# Patient Record
Sex: FEMALE | Race: WHITE | NOT HISPANIC OR LATINO | Employment: FULL TIME | URBAN - NONMETROPOLITAN AREA
[De-identification: names, ages, dates, MRNs, and addresses within clinical notes are randomized per-mention and may not be internally consistent; named-entity substitution may affect disease eponyms.]

---

## 2017-04-25 ENCOUNTER — HISTORY (OUTPATIENT)
Dept: EMERGENCY MEDICINE | Facility: OTHER | Age: 62
End: 2017-04-25

## 2017-05-01 ENCOUNTER — HISTORY (OUTPATIENT)
Dept: FAMILY MEDICINE | Facility: OTHER | Age: 62
End: 2017-05-01

## 2017-05-01 ENCOUNTER — OFFICE VISIT - GICH (OUTPATIENT)
Dept: FAMILY MEDICINE | Facility: OTHER | Age: 62
End: 2017-05-01

## 2017-05-01 DIAGNOSIS — S50.01XA CONTUSION OF RIGHT ELBOW: ICD-10-CM

## 2017-05-01 DIAGNOSIS — I10 ESSENTIAL (PRIMARY) HYPERTENSION: ICD-10-CM

## 2017-05-01 DIAGNOSIS — E03.9 HYPOTHYROIDISM: ICD-10-CM

## 2017-05-01 DIAGNOSIS — E87.6 HYPOKALEMIA: ICD-10-CM

## 2017-05-01 DIAGNOSIS — E83.42 HYPOMAGNESEMIA: ICD-10-CM

## 2017-05-01 DIAGNOSIS — R55 SYNCOPE AND COLLAPSE: ICD-10-CM

## 2017-05-01 LAB
ANION GAP - HISTORICAL: 6 (ref 5–18)
BUN SERPL-MCNC: 17 MG/DL (ref 7–25)
BUN/CREAT RATIO - HISTORICAL: 20
CALCIUM SERPL-MCNC: 10.1 MG/DL (ref 8.6–10.3)
CHLORIDE SERPLBLD-SCNC: 99 MMOL/L (ref 98–107)
CO2 SERPL-SCNC: 29 MMOL/L (ref 21–31)
CREAT SERPL-MCNC: 0.87 MG/DL (ref 0.7–1.3)
GFR IF NOT AFRICAN AMERICAN - HISTORICAL: >60 ML/MIN/1.73M2
GLUCOSE SERPL-MCNC: 101 MG/DL (ref 70–105)
POTASSIUM SERPL-SCNC: 4.2 MMOL/L (ref 3.5–5.1)
SODIUM SERPL-SCNC: 134 MMOL/L (ref 133–143)
TSH - HISTORICAL: 2.29 UIU/ML (ref 0.34–5.6)

## 2017-10-12 ENCOUNTER — OFFICE VISIT - GICH (OUTPATIENT)
Dept: FAMILY MEDICINE | Facility: OTHER | Age: 62
End: 2017-10-12

## 2017-10-12 ENCOUNTER — HISTORY (OUTPATIENT)
Dept: FAMILY MEDICINE | Facility: OTHER | Age: 62
End: 2017-10-12

## 2017-10-12 DIAGNOSIS — Z23 ENCOUNTER FOR IMMUNIZATION: ICD-10-CM

## 2017-10-12 DIAGNOSIS — Z12.31 ENCOUNTER FOR SCREENING MAMMOGRAM FOR MALIGNANT NEOPLASM OF BREAST: ICD-10-CM

## 2017-10-12 DIAGNOSIS — E03.9 HYPOTHYROIDISM: ICD-10-CM

## 2017-10-12 DIAGNOSIS — I10 ESSENTIAL (PRIMARY) HYPERTENSION: ICD-10-CM

## 2017-10-12 LAB
ANION GAP - HISTORICAL: 7 (ref 5–18)
BUN SERPL-MCNC: 9 MG/DL (ref 7–25)
BUN/CREAT RATIO - HISTORICAL: 12
CALCIUM SERPL-MCNC: 9.5 MG/DL (ref 8.6–10.3)
CHLORIDE SERPLBLD-SCNC: 105 MMOL/L (ref 98–107)
CHOL/HDL RATIO - HISTORICAL: 3.53
CHOLESTEROL TOTAL: 233 MG/DL
CO2 SERPL-SCNC: 28 MMOL/L (ref 21–31)
CREAT SERPL-MCNC: 0.74 MG/DL (ref 0.7–1.3)
GFR IF NOT AFRICAN AMERICAN - HISTORICAL: >60 ML/MIN/1.73M2
GLUCOSE SERPL-MCNC: 86 MG/DL (ref 70–105)
HDLC SERPL-MCNC: 66 MG/DL (ref 23–92)
LDLC SERPL CALC-MCNC: 142 MG/DL
MAGNESIUM SERPL-MCNC: 2.1 MG/DL (ref 1.9–2.7)
NON-HDL CHOLESTEROL - HISTORICAL: 167 MG/DL
POTASSIUM SERPL-SCNC: 3.5 MMOL/L (ref 3.5–5.1)
PROVIDER ORDERDED STATUS - HISTORICAL: ABNORMAL
SODIUM SERPL-SCNC: 140 MMOL/L (ref 133–143)
TRIGL SERPL-MCNC: 125 MG/DL
TSH - HISTORICAL: 3.27 UIU/ML (ref 0.34–5.6)

## 2017-10-18 ENCOUNTER — COMMUNICATION - GICH (OUTPATIENT)
Dept: FAMILY MEDICINE | Facility: OTHER | Age: 62
End: 2017-10-18

## 2017-11-28 ENCOUNTER — HISTORY (OUTPATIENT)
Dept: RADIOLOGY | Facility: OTHER | Age: 62
End: 2017-11-28

## 2017-11-28 ENCOUNTER — OFFICE VISIT - GICH (OUTPATIENT)
Dept: FAMILY MEDICINE | Facility: OTHER | Age: 62
End: 2017-11-28

## 2017-11-28 ENCOUNTER — HOSPITAL ENCOUNTER (OUTPATIENT)
Dept: RADIOLOGY | Facility: OTHER | Age: 62
End: 2017-11-28
Attending: FAMILY MEDICINE

## 2017-11-28 ENCOUNTER — HISTORY (OUTPATIENT)
Dept: FAMILY MEDICINE | Facility: OTHER | Age: 62
End: 2017-11-28

## 2017-11-28 DIAGNOSIS — R51.9 HEADACHE: ICD-10-CM

## 2017-11-28 DIAGNOSIS — Z12.31 ENCOUNTER FOR SCREENING MAMMOGRAM FOR MALIGNANT NEOPLASM OF BREAST: ICD-10-CM

## 2017-11-28 DIAGNOSIS — H66.93 OTITIS MEDIA OF BOTH EARS: ICD-10-CM

## 2017-12-06 ENCOUNTER — HOSPITAL ENCOUNTER (OUTPATIENT)
Dept: RADIOLOGY | Facility: OTHER | Age: 62
End: 2017-12-06
Attending: FAMILY MEDICINE

## 2017-12-06 DIAGNOSIS — R51.9 HEADACHE: ICD-10-CM

## 2017-12-11 ENCOUNTER — COMMUNICATION - GICH (OUTPATIENT)
Dept: FAMILY MEDICINE | Facility: OTHER | Age: 62
End: 2017-12-11

## 2017-12-13 ENCOUNTER — COMMUNICATION - GICH (OUTPATIENT)
Dept: FAMILY MEDICINE | Facility: OTHER | Age: 62
End: 2017-12-13

## 2017-12-19 ENCOUNTER — OFFICE VISIT - GICH (OUTPATIENT)
Dept: FAMILY MEDICINE | Facility: OTHER | Age: 62
End: 2017-12-19

## 2017-12-19 ENCOUNTER — HISTORY (OUTPATIENT)
Dept: FAMILY MEDICINE | Facility: OTHER | Age: 62
End: 2017-12-19

## 2017-12-19 DIAGNOSIS — G43.009 MIGRAINE WITHOUT AURA AND WITHOUT STATUS MIGRAINOSUS, NOT INTRACTABLE: ICD-10-CM

## 2017-12-28 NOTE — TELEPHONE ENCOUNTER
Patient Information     Patient Name MRN Sex Steffany Gamez [id number] Female 1955      Telephone Encounter by Ari Villeda LPN at 10/18/2017 12:05 PM     Author:  Ari Villeda LPN Service:  (none) Author Type:  NURS- Licensed Practical Nurse     Filed:  10/18/2017 12:06 PM Encounter Date:  10/18/2017 Status:  Signed     :  Ari Villeda LPN (NURS- Licensed Practical Nurse)            Labs are in but not released. Please adivse and send back to the FM pool.  Ari Villeda LPN ..............10/18/2017 12:06 PM

## 2017-12-28 NOTE — PROGRESS NOTES
Patient Information     Patient Name MRN Sex Steffany Lewis [id number] Female 1955      Progress Notes by Cece Freeman MD at 10/12/2017  3:00 PM     Author:  Cece Freeman MD Service:  (none) Author Type:  Physician     Filed:  10/14/2017 11:31 AM Encounter Date:  10/12/2017 Status:  Signed     :  Cece Freeman MD (Physician)            SUBJECTIVE:    Steffany Altamirano is a 62 y.o. female who presents for multiple concerns    HPI Comments: Nursing Notes:   Rylie Hein  10/12/2017  3:13 PM  Signed  Patient is here for annual physical.  Rylie Hein LPN .............10/12/2017  3:12 PM      Rylie Hein  10/12/2017  3:42 PM  Signed  Patient states has been having dizzy spells for past 3 weeks, lasting few   mins at most. Having 3-4 times a day depending on what she is doing.   Notices most when doing stairs, and ready to get out of bed. Patient also   complaining of having headaches located at forehead area, states will have   to go lay down in dark room.  Rylie Hein LPN .............10/12/2017  3:17 PM      Fell and hit head, in 2017. HAs and dizzy spells since. Chronic daily HA. No change in medications        Allergies     Allergen  Reactions     Paper Tape [Adhesive] Contact Dermatitis   ,   Current Outpatient Prescriptions on File Prior to Visit       Medication  Sig Dispense Refill     levothyroxine (SYNTHROID) 50 mcg tablet Take 1 tablet by mouth before breakfast. 90 tablet 3     lisinopril-hydrochlorothiazide, 20-25 mg, (PRINZIDE, ZESTORETIC) 20-25 mg per tablet Take 1 tablet by mouth once daily. 90 tablet 4     magnesium oxide (MAG-) 400 mg tablet Take 1 tablet by mouth once daily. 90 tablet 3     potassium chloride (KLOR-CON M20) 20 mEq Extended-Release tablet Take 1 tablet by mouth once daily with a meal. 90 tablet 3     No current facility-administered medications on file prior to visit.     and   Patient Active Problem List      Diagnosis  "Date Noted     Chronic dermatitis of hands 03/24/2016     Diverticulosis of sigmoid colon 01/20/2014     Lipoma of other skin and subcutaneous tissue 01/29/2013     Dyshidrotic eczema 06/22/2012     PREDIABETES 03/15/2012     UMBILICAL HERNIA 02/27/2012     SJOGREN'S SYNDROME 01/26/2012     PLANTAR FASCIITIS, BILATERAL 10/21/2010     DEGENERATIVE DISC DISEASE, CERVICAL SPINE, W/RADICULOPATHY      HYPERLIPIDEMIA      OBESITY      HYPERTENSION        REVIEW OF SYSTEMS:  Review of Systems   Constitutional: Negative for chills, fever, malaise/fatigue and weight loss.   HENT: Negative for congestion, ear discharge, ear pain, hearing loss, sinus pain, sore throat and tinnitus.    Eyes: Negative for blurred vision and double vision.   Respiratory: Negative for stridor.    Cardiovascular: Negative for chest pain and palpitations.   Musculoskeletal: Negative for back pain, falls, joint pain, myalgias and neck pain.   Skin: Negative for itching and rash.   Neurological: Positive for dizziness and headaches. Negative for tingling, tremors, sensory change, speech change, focal weakness, seizures and loss of consciousness.       OBJECTIVE:  /84  Pulse 58  Ht 1.562 m (5' 1.5\")  Wt 89.6 kg (197 lb 9.6 oz)  BMI 36.73 kg/m2    EXAM:   Physical Exam   Constitutional: She is oriented to person, place, and time and well-developed, well-nourished, and in no distress.   HENT:   Head: Normocephalic and atraumatic.   Mouth/Throat: Oropharynx is clear and moist.   Eyes: Conjunctivae are normal.   Neck: No thyromegaly present.   Cardiovascular: Normal rate and regular rhythm.    Pulmonary/Chest: Effort normal.   Lymphadenopathy:     She has no cervical adenopathy.   Neurological: She is alert and oriented to person, place, and time. She has normal reflexes. She displays normal reflexes. No cranial nerve deficit. She exhibits normal muscle tone. Gait normal. Coordination normal. GCS score is 15.       ASSESSMENT/PLAN:    ICD-10-CM  "   1. HYPERTENSION I10 BASIC METABOLIC PANEL      MAGNESIUM      BASIC METABOLIC PANEL      MAGNESIUM   2. Hypothyroidism, unspecified type E03.9 TSH      TSH      LIPID PANEL      LIPID PANEL   3. Needs flu shot Z23 FLU VACCINE => 3 YRS PF QUADRIVALENT IIV4 IM      AR ADMIN VACC INITIAL SEASONAL AFFILIATE ONLY   4. Breast cancer screening Z12.31 XR MAMMO BILAT SCREENING        Plan:  Due for labs and refills, rule out metabolic causes of headaches, doubt related to previous head injury which was greater than 3months ago. Could consider CT if persists  Schedule mammogram and given flu shot    Total of 25 minutes was spent with patient in counseling and coordination of care.  Cece De La Cruz MD  11:30 AM 10/14/2017

## 2017-12-28 NOTE — PROGRESS NOTES
Patient Information     Patient Name MRN Sex Steffany Gamez [id number] Female 1955      Progress Notes by Cece Freeman MD at 2017  2:00 PM     Author:  Cece Freeman MD Service:  (none) Author Type:  Physician     Filed:  2017 12:32 PM Encounter Date:  2017 Status:  Signed     :  Cece Freeman MD (Physician)            SUBJECTIVE:    Steffany Altamirano is a 62 y.o. female who presents for right ear pain and ongoing HAs    HPI Comments: Daily posterior HAs since falling and hitting her head, no LOC, seen in ER  HAs worse as day progresses, no assoc neuro symptoms  NO new medications, BP at goal    New concern of right ear pain, severe, throbbing, no recent fever or drainage      Allergies     Allergen  Reactions     Paper Tape [Adhesive] Contact Dermatitis   ,   Current Outpatient Prescriptions on File Prior to Visit       Medication  Sig Dispense Refill     levothyroxine (SYNTHROID) 50 mcg tablet Take 1 tablet by mouth before breakfast. 90 tablet 3     lisinopril-hydrochlorothiazide, 20-25 mg, (PRINZIDE, ZESTORETIC) 20-25 mg per tablet Take 1 tablet by mouth once daily. 90 tablet 4     magnesium oxide (MAG-) 400 mg tablet Take 1 tablet by mouth once daily. 90 tablet 3     potassium chloride (KLOR-CON M20) 20 mEq Extended-Release tablet Take 1 tablet by mouth once daily with a meal. 90 tablet 3     No current facility-administered medications on file prior to visit.     and   Patient Active Problem List      Diagnosis Date Noted     Chronic dermatitis of hands 2016     Diverticulosis of sigmoid colon 2014     Lipoma of other skin and subcutaneous tissue 2013     Dyshidrotic eczema 2012     PREDIABETES 03/15/2012     UMBILICAL HERNIA 2012     SJOGREN'S SYNDROME 2012     PLANTAR FASCIITIS, BILATERAL 10/21/2010     DEGENERATIVE DISC DISEASE, CERVICAL SPINE, W/RADICULOPATHY      HYPERLIPIDEMIA      OBESITY       HYPERTENSION        REVIEW OF SYSTEMS:  Review of Systems   Constitutional: Negative for chills, fever, malaise/fatigue and weight loss.   HENT: Positive for ear pain and hearing loss. Negative for congestion, ear discharge and nosebleeds.    Eyes: Negative for blurred vision, double vision, photophobia and pain.   Cardiovascular: Negative for chest pain and palpitations.   Gastrointestinal: Negative for nausea and vomiting.   Musculoskeletal: Negative for back pain and neck pain.   Skin: Negative for itching and rash.   Neurological: Positive for headaches. Negative for dizziness, tingling, tremors, sensory change, speech change, focal weakness, seizures and loss of consciousness.       OBJECTIVE:  /84  Pulse 60  Temp 98.8  F (37.1  C) (Tympanic)  Wt 88.9 kg (196 lb)  BMI 36.43 kg/m2    EXAM:   Physical Exam   Constitutional: She is oriented to person, place, and time and well-developed, well-nourished, and in no distress.   HENT:   Head: Normocephalic and atraumatic.   Right Ear: External ear normal.   Left Ear: External ear normal.   Nose: Nose normal.   Mouth/Throat: Oropharynx is clear and moist.   Eyes: EOM are normal.   Neck: No thyromegaly present.   Cardiovascular: Normal rate.    Lymphadenopathy:     She has no cervical adenopathy.   Neurological: She is alert and oriented to person, place, and time. She has normal reflexes. She displays normal reflexes. No cranial nerve deficit. She exhibits normal muscle tone. Gait normal. Coordination normal.   Skin: No rash noted.   Psychiatric: Affect normal.       ASSESSMENT/PLAN:    ICD-10-CM    1. Bilateral otitis media, unspecified otitis media type H66.93 amoxicillin (AMOXIL) 875 mg tablet   2. Chronic daily headache R51 MR HEAD BRAIN WWO      CANCELED: MR HEAD BRAIN WO        Plan:  She reports consistent HAs since falling and hitting her head > 4 months, no focal findings on exam. She is quite concerned the trauma is the source of her pain. Will  proceed with MRI  Has acute AOM, will treat with amox.    There are no Patient Instructions on file for this visit.  Cece De La Cruz MD  12:29 PM 11/30/2017

## 2017-12-28 NOTE — PROGRESS NOTES
Patient Information     Patient Name MRN Sex Steffany Gamez [id number] Female 1955      Progress Notes by Sharifa Huber at 2017  9:12 AM     Author:  Sharifa Huebr Service:  (none) Author Type:  (none)     Filed:  2017  9:12 AM Date of Service:  2017  9:12 AM Status:  Signed     :  Sharifa Huber            Falls Risk Criteria:    Age 65 and older or under age 4        Sensory deficits    Poor vision    Use of ambulatory aides    Impaired judgment    Unable to walk independently    Meets High Risk criteria for falls:  no

## 2017-12-28 NOTE — TELEPHONE ENCOUNTER
Patient Information     Patient Name MRN Steffany Stallworth [id number] Female 1955      Telephone Encounter by Cece Freeman MD at 10/20/2017  8:19 AM     Author:  Cece Freeman MD Service:  (none) Author Type:  Physician     Filed:  10/20/2017  8:19 AM Encounter Date:  10/18/2017 Status:  Signed     :  Cece Freeman MD (Physician)            Sent letter  Cece De La Cruz MD  8:19 AM 10/20/2017

## 2017-12-28 NOTE — TELEPHONE ENCOUNTER
Patient Information     Patient Name MRN Sex Steffany Gamez [id number] Female 1955      Telephone Encounter by Terri Chowdhury at 10/18/2017 11:52 AM     Author:  Terri Chowdhury Service:  (none) Author Type:  (none)     Filed:  10/18/2017 11:53 AM Encounter Date:  10/18/2017 Status:  Signed     :  Terri Chowdhury            TYP-Pt called looking for lab results. Says was expecting them on Monday? Thank You.  Terri Chowdhury ....................  10/18/2017   11:53 AM

## 2017-12-30 NOTE — NURSING NOTE
Patient Information     Patient Name MRN Sex Steffany Gamez [id number] Female 1955      Nursing Note by Rylie Hein at 2017  2:00 PM     Author:  Rylie Hein Service:  (none) Author Type:  (none)     Filed:  2017  2:19 PM Encounter Date:  2017 Status:  Signed     :  Rylie Hein            Patient is here for right ear issues. States unable to hear good, feels like is muffled. Started a couple weeks ago. Patient also wondering about migraines, and about having scan on head due to a fall and hitting head.   Rylie Hein LPN .............2017  1:51 PM

## 2017-12-30 NOTE — NURSING NOTE
Patient Information     Patient Name MRN Sex Steffany Gamez [id number] Female 1955      Nursing Note by Rylie Hein at 10/12/2017  3:00 PM     Author:  Rylie Hein Service:  (none) Author Type:  (none)     Filed:  10/12/2017  3:42 PM Encounter Date:  10/12/2017 Status:  Signed     :  Rylie Hein            Patient states has been having dizzy spells for past 3 weeks, lasting few mins at most. Having 3-4 times a day depending on what she is doing. Notices most when doing stairs, and ready to get out of bed. Patient also complaining of having headaches located at forehead area, states will have to go lay down in dark room.  Rylie Hein LPN .............10/12/2017  3:17 PM

## 2017-12-30 NOTE — NURSING NOTE
Patient Information     Patient Name MRN Sex Steffany Gamez [id number] Female 1955      Nursing Note by Rylie Hein at 10/12/2017  3:00 PM     Author:  Rylie Hein Service:  (none) Author Type:  (none)     Filed:  10/12/2017  3:13 PM Encounter Date:  10/12/2017 Status:  Signed     :  Rylie Hein            Patient is here for annual physical.  Rylie Hein LPN .............10/12/2017  3:12 PM

## 2018-01-04 NOTE — NURSING NOTE
Patient Information     Patient Name MRN Sex Steffany Gamez [id number] Female 1955      Nursing Note by Olga Raza at 2017 11:30 AM     Author:  Olga Raza Service:  (none) Author Type:  (none)     Filed:  2017 11:49 AM Encounter Date:  2017 Status:  Signed     :  Olga Raza            COMING IN FOR A F/U FROM ER, HITTING HER HEAD  Olga Raza ....................  2017   11:22 AM

## 2018-01-04 NOTE — PROGRESS NOTES
Patient Information     Patient Name MRN Sex Steffany Gamez [id number] Female 1955      Progress Notes by Bc Gustafson MD at 2017 11:30 AM     Author:  Bc Gustafson MD Service:  (none) Author Type:  Physician     Filed:  2017  9:31 AM Encounter Date:  2017 Status:  Signed     :  Bc Gustafson MD (Physician)            SUBJECTIVE:    Steffany Altamirano is a 61 y.o. female who presents for emergency department follow up and med refill    HPI    Last week while I was removing a skin lesion from her , she became syncopal.  Stood up, walked into the tapia where she fell and his her head and right elbow.  She says heart rate was 44.  Was then taken to the emergency department by the rapid response team.  Notes reviewed as well as lab.  Currently reports right elbow pain with an abrasion and anti-hypertensive headache.  Using tylenol every 6 hours.  Ice packs also.  Mentation is normal.  No nausea/vomiting.  A little dizzy when she gets up too fast.  No chest pain at all.  Potassium was low at 3.2 and she was placed on replacement.    Wants all of her medications refilled as well.    Allergies     Allergen  Reactions     Paper Tape [Adhesive] Contact Dermatitis   ,   No current outpatient prescriptions on file prior to visit.     No current facility-administered medications on file prior to visit.    ,   Past Medical History:     Diagnosis  Date     Hypertension     Hypertension      Prediabetes      Sjogren's disease (HC)     Possible Sjogren's     and   Past Surgical History:      Procedure  Laterality Date      SECTION  ,     Epigastric hernia repair by [facility]       CHOLECYSTECTOMY  1984     COLONOSCOPY DIAGNOSTIC  14    F/U        HERNIA REPAIR  10/25/02    Epigastric hernia repair by [facility]       LIPOMA RESECTION  13    LUQ       ROTATOR CUFF REPAIR Right     Right Dr. Kee GAINES AND BSO  1996    Supracervical hysterectomy and  bilateral oophorectomy       TUBAL LIGATION  1976       REVIEW OF SYSTEMS:  Review of Systems   Constitutional: Negative for chills and fever.   Eyes: Negative for blurred vision and double vision.   Respiratory: Negative for shortness of breath.    Cardiovascular: Negative for chest pain.   Gastrointestinal: Negative for nausea and vomiting.   Genitourinary: Negative for dysuria, frequency and urgency.   Musculoskeletal: Positive for falls and joint pain.   Skin: Negative for itching and rash.   Neurological: Positive for dizziness and headaches.   Endo/Heme/Allergies: Does not bruise/bleed easily.   Psychiatric/Behavioral: Negative for memory loss. The patient does not have insomnia.        OBJECTIVE:  /68  Resp 16    EXAM:   Physical Exam   Constitutional: She is oriented to person, place, and time and well-developed, well-nourished, and in no distress. No distress.   HENT:   Head: Normocephalic and atraumatic.   Right Ear: External ear normal.   Left Ear: External ear normal.   Eyes: Pupils are equal, round, and reactive to light.   Neck: Normal range of motion. No thyromegaly present.   Cardiovascular: Normal rate, regular rhythm and normal heart sounds.  Exam reveals no gallop and no friction rub.    No murmur heard.  Pulmonary/Chest: Effort normal. No respiratory distress. She has no wheezes. She has no rales.   Abdominal: Soft. She exhibits no distension. There is no tenderness. There is no rebound and no guarding.   Musculoskeletal:   Mild abrasion distal to right olecranon.  Mild edema, full range of motion and no bony pain on palpation.   Neurological: She is alert and oriented to person, place, and time.   Skin: Skin is warm and dry. No rash noted. She is not diaphoretic. No erythema.   Psychiatric: Memory, affect and judgment normal.   Vitals reviewed.      ASSESSMENT/PLAN:    ICD-10-CM    1. Vasovagal syncope R55    2. HYPERTENSION I10 lisinopril-hydrochlorothiazide, 20-25 mg, (PRINZIDE,  ZESTORETIC) 20-25 mg per tablet      BASIC METABOLIC PANEL   3. Contusion of right elbow, initial encounter S50.01XA    4. Hypokalemia E87.6 potassium chloride (KLOR-CON M20) 20 mEq Extended-Release tablet   5. Hypothyroidism, unspecified type E03.9 levothyroxine (SYNTHROID) 50 mcg tablet      TSH   6. Hypomagnesemia E83.42 magnesium oxide (MAG-) 400 mg tablet        Plan:  Given she is on 50 mg total of hyrdrochlorothiazide, and there is nearly no additional blood pressure benefit from 50 compared to 25, along with the syncope and hypokalemia, will have her stop the 25 mg hyrdrochlorothiazide and stay on zestoretic.  Has no other worrisome findings that would make me think of cardia arrhythmias as the trigger for the fall.   Refilled her Potassium.  Ice and rest for the elbow, soap and water to the abrasion.    Bc Gustafson MD ....................  5/1/2017   12:03 PM

## 2018-01-24 ENCOUNTER — DOCUMENTATION ONLY (OUTPATIENT)
Dept: FAMILY MEDICINE | Facility: OTHER | Age: 63
End: 2018-01-24

## 2018-01-24 PROBLEM — E78.5 HYPERLIPIDEMIA: Status: ACTIVE | Noted: 2018-01-24

## 2018-01-24 PROBLEM — I10 HYPERTENSION: Status: ACTIVE | Noted: 2018-01-24

## 2018-01-24 PROBLEM — E66.9 OBESITY: Status: ACTIVE | Noted: 2018-01-24

## 2018-01-24 PROBLEM — M50.20 DISPLACEMENT OF CERVICAL INTERVERTEBRAL DISC WITHOUT MYELOPATHY: Status: ACTIVE | Noted: 2018-01-24

## 2018-01-24 RX ORDER — MAGNESIUM OXIDE 400 MG/1
1 TABLET ORAL DAILY
Status: ON HOLD | COMMUNITY
Start: 2017-05-01 | End: 2020-05-21

## 2018-01-24 RX ORDER — LISINOPRIL AND HYDROCHLOROTHIAZIDE 20; 25 MG/1; MG/1
1 TABLET ORAL DAILY
COMMUNITY
Start: 2017-05-01 | End: 2018-04-09

## 2018-01-24 RX ORDER — AMITRIPTYLINE HYDROCHLORIDE 10 MG/1
1 TABLET ORAL AT BEDTIME
COMMUNITY
Start: 2017-12-19 | End: 2018-04-09

## 2018-01-24 RX ORDER — POTASSIUM CHLORIDE 1500 MG/1
1 TABLET, EXTENDED RELEASE ORAL
COMMUNITY
Start: 2017-05-01 | End: 2018-04-09

## 2018-01-24 RX ORDER — LEVOTHYROXINE SODIUM 50 UG/1
1 TABLET ORAL
COMMUNITY
Start: 2017-05-01 | End: 2018-04-09

## 2018-01-24 RX ORDER — RIZATRIPTAN BENZOATE 5 MG/1
1 TABLET, ORALLY DISINTEGRATING ORAL 2 TIMES DAILY PRN
COMMUNITY
Start: 2017-12-19 | End: 2018-11-30

## 2018-01-27 VITALS
HEIGHT: 62 IN | HEART RATE: 58 BPM | BODY MASS INDEX: 36.36 KG/M2 | WEIGHT: 197.6 LBS | DIASTOLIC BLOOD PRESSURE: 84 MMHG | SYSTOLIC BLOOD PRESSURE: 148 MMHG

## 2018-01-27 VITALS — SYSTOLIC BLOOD PRESSURE: 126 MMHG | RESPIRATION RATE: 16 BRPM | DIASTOLIC BLOOD PRESSURE: 68 MMHG

## 2018-01-27 VITALS
HEART RATE: 60 BPM | DIASTOLIC BLOOD PRESSURE: 84 MMHG | TEMPERATURE: 98.8 F | WEIGHT: 196 LBS | SYSTOLIC BLOOD PRESSURE: 130 MMHG

## 2018-02-09 VITALS
TEMPERATURE: 97.2 F | SYSTOLIC BLOOD PRESSURE: 118 MMHG | BODY MASS INDEX: 36.25 KG/M2 | DIASTOLIC BLOOD PRESSURE: 80 MMHG | HEART RATE: 66 BPM | WEIGHT: 195 LBS

## 2018-02-12 NOTE — TELEPHONE ENCOUNTER
Patient Information     Patient Name MRN Steffany Stallworth [id number] Female 1955      Telephone Encounter by Meghna Cervantes at 2017  9:22 AM     Author:  Meghna Cervantes Service:  (none) Author Type:  (none)     Filed:  2017  9:22 AM Encounter Date:  2017 Status:  Signed     :  Meghna Cervantes            Left message for pt to call clinic back.  Meghna Cervantes

## 2018-02-12 NOTE — TELEPHONE ENCOUNTER
Patient Information     Patient Name MRN Sex Steffany Gamez [id number] Female 1955      Telephone Encounter by Meghna Cervantes at 2017  3:53 PM     Author:  Meghna Cervantes Service:  (none) Author Type:  (none)     Filed:  2017  3:53 PM Encounter Date:  2017 Status:  Signed     :  Meghna Cervantes            Notified pt of MRI results that are in a previous phone note.  Meghna Cervantes

## 2018-02-12 NOTE — TELEPHONE ENCOUNTER
Patient Information     Patient Name MRN Sex Steffany Gamez [id number] Female 1955      Telephone Encounter by Shaina Jamil at 2017 12:15 PM     Author:  Shaina Jamil Service:  (none) Author Type:  (none)     Filed:  2017 12:15 PM Encounter Date:  2017 Status:  Signed     :  Shaina Jamil            Patient is looking for MRI results from last week.    Shaina Jamil LPN.................. 2017 12:15 PM

## 2018-02-12 NOTE — PROGRESS NOTES
Patient Information     Patient Name MRN Sex Steffany Gamez [id number] Female 1955      Progress Notes by Laura Shoemaker at 2017  9:54 AM     Author:  Laura Shoemaker Service:  (none) Author Type:  Other Clinical Staff     Filed:  2017  9:54 AM Date of Service:  2017  9:54 AM Status:  Signed     :  Laura Shoemaker (Other Clinical Staff)            Falls Risk Criteria:    Age 65 and older or under age 4        Sensory deficits    Poor vision    Use of ambulatory aides    Impaired judgment    Unable to walk independently    Meets High Risk criteria for falls:  no

## 2018-02-12 NOTE — TELEPHONE ENCOUNTER
Patient Information     Patient Name MRN Sex Steffany Gamez [id number] Female 1955      Telephone Encounter by Meghna Cervantes at 2017  3:52 PM     Author:  Meghna Cervantes Service:  (none) Author Type:  (none)     Filed:  2017  3:53 PM Encounter Date:  2017 Status:  Signed     :  Meghna Cervantes            Notified pt of MRI results that were in a previous phone note.  Meghna Cervantes

## 2018-02-12 NOTE — NURSING NOTE
Patient Information     Patient Name MRN Sex Steffany Gamez [id number] Female 1955      Nursing Note by Rylie Hein at 2017  8:15 AM     Author:  Rylie Hein Service:  (none) Author Type:  (none)     Filed:  2017  8:32 AM Encounter Date:  2017 Status:  Signed     :  Rylie Hein            Patient is here for follow up of MRI results for headaches. Patient states still having headaches daily.  Rylie Hein LPN .............2017  8:17 AM

## 2018-02-12 NOTE — TELEPHONE ENCOUNTER
Patient Information     Patient Name MRN Sex Steffany Gamez [id number] Female 1955      Telephone Encounter by Bridget Maguire at 2017 12:13 PM     Author:  Bridget Maguire Service:  (none) Author Type:  (none)     Filed:  2017 12:13 PM Encounter Date:  2017 Status:  Signed     :  Bridget Maguire            TYP- Patient is looking for results from scan last week.  Thank you

## 2018-02-12 NOTE — TELEPHONE ENCOUNTER
Patient Information     Patient Name MRN Sex Steffany Gamez [id number] Female 1955      Telephone Encounter by Stephenie Schroeder at 2017  9:09 AM     Author:  Stephenie Schroeder Service:  (none) Author Type:  (none)     Filed:  2017  9:12 AM Encounter Date:  2017 Status:  Signed     :  Stephenie Schroeder            Patient called today stating that dr mark called her daughters to give her MRI results.   Patient called back to give us her new phone number.  Her number has been updated in the patients chart.  Please call patient with her MRI results.  Thank You  Stephenie Schroeder ....................  2017   9:12 AM

## 2018-02-12 NOTE — PATIENT INSTRUCTIONS
Patient Information     Patient Name MRN Sex Steffany Gamez [id number] Female 1955      Patient Instructions by Cece Freeman MD at 2017  8:15 AM     Author:  Cece Freeman MD Service:  (none) Author Type:  Physician     Filed:  2017  8:40 AM Encounter Date:  2017 Status:  Signed     :  Cece Freeman MD (Physician)            MRI was normal  Recent labs due not point to cause of headaches  Most likely Migraine Headaches  Given frequency of headaches, recommend daily preventive medication  Keep track of headaches in log, triggers, frequency  Also try maxalt for abortive treatment for severe headaches

## 2018-02-12 NOTE — TELEPHONE ENCOUNTER
Patient Information     Patient Name MRN Sex Steffany Gamez [id number] Female 1955      Telephone Encounter by Cece Freemna MD at 2017 10:34 AM     Author:  Cece Freeman MD Service:  (none) Author Type:  Physician     Filed:  2017 10:34 AM Encounter Date:  2017 Status:  Signed     :  Cece Freeman MD (Physician)            MRI was normal, no mass/bleeding or trauma  Cece De La Cruz MD  10:34 AM 2017

## 2018-02-12 NOTE — TELEPHONE ENCOUNTER
Patient Information     Patient Name MRN Steffany Stallworth [id number] Female 1955      Telephone Encounter by Meghna Cervantes at 2017  3:54 PM     Author:  Meghna Cervantes Service:  (none) Author Type:  (none)     Filed:  2017  3:55 PM Encounter Date:  2017 Status:  Signed     :  Meghna Cervantes            Notified pt of Dr. Freeman's message below.  Meghna Cervantes

## 2018-02-12 NOTE — TELEPHONE ENCOUNTER
Patient Information     Patient Name MRN Steffany Stallworth [id number] Female 1955      Telephone Encounter by Rlyie Hein at 2017 11:21 AM     Author:  Rylie Hein Service:  (none) Author Type:  (none)     Filed:  2017 11:22 AM Encounter Date:  2017 Status:  Signed     :  Rylie Hein            Left message to call back with daughter adam. Number given by Patient is daughters.  Rylie Hein LPN ....................  2017   11:21 AM

## 2018-02-12 NOTE — TELEPHONE ENCOUNTER
Patient Information     Patient Name MRN Sex Steffany Gamez [id number] Female 1955      Telephone Encounter by Stephenie Schroeder at 2017  3:46 PM     Author:  Stephenie Schroeder Service:  (none) Author Type:  (none)     Filed:  2017  3:48 PM Encounter Date:  2017 Status:  Signed     :  Stephenie Schroeder              Patient knows that Pete is out of the office until Tuesday.  But she is wondering if anyone can let her know her results of her MRI. Please call patient back Thank You.   Stephenie Schroeder ....................  2017   3:48 PM

## 2018-02-12 NOTE — TELEPHONE ENCOUNTER
"Patient Information     Patient Name MRN Steffany Stallworth [id number] Female 1955      Telephone Encounter by Ernesto Cervantes at 2017 10:42 AM     Author:  Ernesto Cervantes Service:  (none) Author Type:  (none)     Filed:  2017 10:43 AM Encounter Date:  2017 Status:  Signed     :  Ernesto Cervantes            \"Discontinued number\". = [phone]  \"No Voicemail set up\". = [phone]    Ernesto Cervantes ....................  2017   10:43 AM                 "

## 2018-02-12 NOTE — PROGRESS NOTES
Patient Information     Patient Name MRN Sex Steffany Gamez [id number] Female 1955      Progress Notes by Cece Freeman MD at 2017  8:15 AM     Author:  Cece Freeman MD Service:  (none) Author Type:  Physician     Filed:  2017 12:11 PM Encounter Date:  2017 Status:  Signed     :  Cece Freeman MD (Physician)            SUBJECTIVE:    Steffany Altamirano is a 62 y.o. female who presents for HAs, recent MRI scan was normal    HPI Comments: Presents for review of recent MRI which was completed for HAs and previous head trauma.    She complains of nearly daily HAs, mostly occur at the end of the day. Dull frontal, severe, lasts hours. Take excedrin every day.    No focal neuro symptoms  No recent change in medications, no known triggers      Allergies     Allergen  Reactions     Paper Tape [Adhesive] Contact Dermatitis   ,   Current Outpatient Prescriptions on File Prior to Visit       Medication  Sig Dispense Refill     levothyroxine (SYNTHROID) 50 mcg tablet Take 1 tablet by mouth before breakfast. 90 tablet 3     lisinopril-hydrochlorothiazide, 20-25 mg, (PRINZIDE, ZESTORETIC) 20-25 mg per tablet Take 1 tablet by mouth once daily. 90 tablet 4     magnesium oxide (MAG-) 400 mg tablet Take 1 tablet by mouth once daily. 90 tablet 3     potassium chloride (KLOR-CON M20) 20 mEq Extended-Release tablet Take 1 tablet by mouth once daily with a meal. 90 tablet 3     No current facility-administered medications on file prior to visit.     and   Patient Active Problem List      Diagnosis Date Noted     Chronic dermatitis of hands 2016     Diverticulosis of sigmoid colon 2014     Lipoma of other skin and subcutaneous tissue 2013     Dyshidrotic eczema 2012     PREDIABETES 03/15/2012     UMBILICAL HERNIA 2012     SJOGREN'S SYNDROME 2012     PLANTAR FASCIITIS, BILATERAL 10/21/2010     DEGENERATIVE DISC DISEASE, CERVICAL SPINE,  W/RADICULOPATHY      HYPERLIPIDEMIA      OBESITY      HYPERTENSION        REVIEW OF SYSTEMS:  Review of Systems   Constitutional: Negative for chills, diaphoresis, fever, malaise/fatigue and weight loss.   HENT: Negative for congestion and hearing loss.    Eyes: Negative for blurred vision and double vision.   Gastrointestinal: Negative for nausea and vomiting.   Neurological: Positive for headaches. Negative for dizziness, tingling, tremors, sensory change, speech change, focal weakness, seizures, loss of consciousness and weakness.       OBJECTIVE:  /80 (Cuff Site: Right Arm, Position: Sitting, Cuff Size: Adult Large)  Pulse 66  Temp 97.2  F (36.2  C) (Tympanic)  Wt 88.5 kg (195 lb)  BMI 36.25 kg/m2    EXAM:   Physical Exam   Constitutional: She is oriented to person, place, and time and well-developed, well-nourished, and in no distress.   Eyes: EOM are normal.   Neck: No JVD present. No thyromegaly present.   Lymphadenopathy:     She has no cervical adenopathy.   Neurological: She is alert and oriented to person, place, and time. She has normal reflexes. She displays normal reflexes. No cranial nerve deficit. She exhibits normal muscle tone. Gait normal. Coordination normal. GCS score is 15.   Psychiatric: Memory, affect and judgment normal.       ASSESSMENT/PLAN:    ICD-10-CM    1. Migraine without aura and without status migrainosus, not intractable G43.009 amitriptyline (ELAVIL) 10 mg tablet      rizatriptan (MAXALT MLT) 5 mg disintegrating tablet        Plan:    Patient Instructions   MRI was normal  Recent labs due not point to cause of headaches  Most likely Migraine Headaches  Given frequency of headaches, recommend daily preventive medication  Keep track of headaches in log, triggers, frequency  Also try maxalt for abortive treatment for severe headaches    Total of 25 minutes was spent with patient in counseling and coordination of care.  Cece De La Cruz MD  12:08 PM 12/21/2017

## 2018-02-26 ENCOUNTER — OFFICE VISIT (OUTPATIENT)
Dept: FAMILY MEDICINE | Facility: OTHER | Age: 63
End: 2018-02-26
Attending: NURSE PRACTITIONER
Payer: COMMERCIAL

## 2018-02-26 VITALS
TEMPERATURE: 95.9 F | DIASTOLIC BLOOD PRESSURE: 80 MMHG | WEIGHT: 186.31 LBS | HEART RATE: 68 BPM | SYSTOLIC BLOOD PRESSURE: 128 MMHG | BODY MASS INDEX: 34.63 KG/M2

## 2018-02-26 DIAGNOSIS — H10.32 ACUTE CONJUNCTIVITIS OF LEFT EYE, UNSPECIFIED ACUTE CONJUNCTIVITIS TYPE: Primary | ICD-10-CM

## 2018-02-26 PROCEDURE — 99213 OFFICE O/P EST LOW 20 MIN: CPT | Performed by: NURSE PRACTITIONER

## 2018-02-26 PROCEDURE — G0463 HOSPITAL OUTPT CLINIC VISIT: HCPCS

## 2018-02-26 RX ORDER — GENTAMICIN SULFATE 3 MG/ML
1 SOLUTION/ DROPS OPHTHALMIC 4 TIMES DAILY
Qty: 2 ML | Refills: 0 | Status: SHIPPED | OUTPATIENT
Start: 2018-02-26 | End: 2019-02-15

## 2018-02-26 NOTE — PROGRESS NOTES
Nursing Notes:   Jessica Leary CMA  2/26/2018  3:49 PM  Addendum  Patient presents to the clinic for left eye redness that started yesterday. Patient's reports the eye being watery and puffy when she woke up this AM.  Jessica MONTANO CMA.......2/26/2018..3:45 PM      SUBJECTIVE:   Steffany Altamirano is a 62 year old female who presents to clinic today for the following health issues:    Eye(s) Problem      Duration: yesterday, red and now watery    Description:  Location: left  Pain: no   Redness: YES  Discharge: YES- watery    Accompanying signs and symptoms: Watery eye, d/c on lashes when woke up.     History (Trauma, foreign body exposure,): None    Precipitating or alleviating factors (contact use): None    Therapies tried and outcome: None      Problem list and histories reviewed & adjusted, as indicated.  Additional history: as documented    Current Outpatient Prescriptions   Medication Sig Dispense Refill     amitriptyline (ELAVIL) 10 MG tablet Take 1 tablet by mouth At Bedtime For 2 weeks, then increase to 2 tablets at bedtime       levothyroxine (SYNTHROID/LEVOTHROID) 50 MCG tablet Take 1 tablet by mouth every morning (before breakfast)       magnesium oxide (MAG-OX) 400 MG tablet Take 1 tablet by mouth daily       potassium chloride SA (K-DUR/KLOR-CON M) 20 MEQ CR tablet Take 1 tablet by mouth daily with food       rizatriptan (MAXALT-MLT) 5 MG ODT tab Place 1 tablet under the tongue 2 times daily as needed for migraine Give at minimum 2 hrs apart. Max Dose: 30 mg per 24 hrs       lisinopril-hydrochlorothiazide (PRINZIDE/ZESTORETIC) 20-25 MG per tablet Take 1 tablet by mouth daily       lisinopril-hydrochlorothiazide (PRINZIDE,ZESTORETIC) 20-25 MG per tablet Take 1 tablet by mouth daily       Allergies   Allergen Reactions     Adhesive Tape      Paper tape     Liquid Adhesive      Other reaction(s): Contact Dermatitis       Reviewed and updated as needed this visit by clinical staff  Tobacco  Allergies  Meds   Med Hx  Surg Hx  Fam Hx  Soc Hx      Reviewed and updated as needed this visit by Provider     ROS:  A comprehensive 10 point ROS was obtained and documented for notable findings in the HPI.       OBJECTIVE:     /80 (BP Location: Right arm, Patient Position: Sitting, Cuff Size: Adult Regular)  Pulse 68  Temp 95.9  F (35.5  C) (Tympanic)  Wt 186 lb 5 oz (84.5 kg)  BMI 34.63 kg/m2  Body mass index is 34.63 kg/(m^2).  GENERAL: healthy, alert and no distress  EYES: conjunctiva/corneas- conjunctival injection OS  HENT: normal cephalic/atraumatic, right ear: normal: no effusions, no erythema, normal landmarks, left ear: normal: no effusions, no erythema, normal landmarks, oropharynx clear and oral mucous membranes moist  NECK: no adenopathy  RESP: Resp with ease  CV: regular rates and rhythm  SKIN: no suspicious lesions or rashes  PSYCH: mentation appears normal, affect normal/bright    Diagnostic Test Results:  none     ASSESSMENT/PLAN:     1. Acute conjunctivitis of left eye, unspecified acute conjunctivitis type  - gentamicin (GARAMYCIN) 0.3 % ophthalmic solution; Place 1 drop Into the left eye 4 times daily for 7 days  Dispense: 2 mL; Refill: 0    Medical Decision Making:    Differential Diagnosis:  Viral VS bacterial    PLAN:    URI Adult:  Ibuprofen and Saline eye rinses. ABX is discussed. F/u if needed.     Followup:    If not improving or if condition worsens, follow up with your Primary Care Provider        Little Barnes NP, 2/26/2018 3:44 PM

## 2018-02-26 NOTE — PATIENT INSTRUCTIONS

## 2018-02-26 NOTE — NURSING NOTE
Patient presents to the clinic for left eye redness that started yesterday. Patient's reports the eye being watery and puffy when she woke up this AM.  Jessica MONTANO CMA.......2/26/2018..3:45 PM

## 2018-02-26 NOTE — MR AVS SNAPSHOT
After Visit Summary   2/26/2018    Steffany Altamirano    MRN: [id number]           Patient Information     Date Of Birth          1955        Visit Information        Provider Department      2/26/2018 3:15 PM Little Barnes NP [facility]        Today's Diagnoses     Acute conjunctivitis of left eye, unspecified acute conjunctivitis type    -  1      Care Instructions      Conjunctivitis, Nonspecific    The membrane that covers the white part of your eye (the conjunctiva) is inflamed. Inflammation happens when your body responds to an injury, allergic reaction, infection, or illness. Symptoms of inflammation in the eye may include redness, irritation, itching, swelling, or burning. These symptoms should go away within the next 24 hours. Conjunctivitis may be related to a particle that was in your eye. If so, it may wash out with your tears or irrigation treatment. Being exposed to liquid chemicals or fumes may also cause this reaction.   Home care    Apply a cold pack over the eye for 20 minutes at a time. This will reduce pain. To make a cold pack, put ice cubes in a plastic bag that seals at the top. Wrap the bag in a clean, thin towel or cloth.    Artificial tears may be prescribed to reduce irritation or redness.  These should be used 3 to 4 times a day.    You may use acetaminophen or ibuprofen to control pain, unless another medicine was prescribed. (Note: If you have chronic liver or kidney disease, or if you have ever had a stomach ulcer or gastrointestinal bleeding, talk with your healthcare provider before using these medicines.)  Follow-up care  Follow up with your healthcare provider, or as advised.  When to seek medical advice  Call your healthcare provider right away if any of these occur:    Increased eyelid swelling    Increased eye pain    Increased redness or drainage from the eye    Increased blurry vision or increased sensitivity to light    Failure of  "normal vision to return within 24 to 48 hours                  Follow-ups after your visit        Who to contact     If you have questions or need follow up information about today's clinic visit or your schedule please contact [facility] directly at [phone].  Normal or non-critical lab and imaging results will be communicated to you by [employer], letter or phone within 4 business days after the clinic has received the results. If you do not hear from us within 7 days, please contact the clinic through [employer] or phone. If you have a critical or abnormal lab result, we will notify you by phone as soon as possible.  Submit refill requests through [facility] or call your pharmacy and they will forward the refill request to us. Please allow 3 business days for your refill to be completed.          Additional Information About Your Visit        [employer] Information     [facility] lets you send messages to your doctor, view your test results, renew your prescriptions, schedule appointments and more. To sign up, go to [email or web address] . Click on \"Log in\" on the left side of the screen, which will take you to the Welcome page. Then click on \"Sign up Now\" on the right side of the page.     You will be asked to enter the access code listed below, as well as some personal information. Please follow the directions to create your username and password.     Your access code is: [email or web address]  Expires: 2018  4:05 PM     Your access code will  in 90 days. If you need help or a new code, please call your [place] clinic or [phone].        Care EveryWhere ID     This is your Care EveryWhere ID. This could be used by other organizations to access your [facility] [place] medical records  [id number]        Your Vitals Were     Pulse Temperature BMI (Body Mass Index)             68 95.9  F (35.5  C) (Tympanic) 34.63 kg/m2          Blood Pressure from Last 3 Encounters:   18 128/80   17 118/80 "   11/28/17 130/84    Weight from Last 3 Encounters:   02/26/18 186 lb 5 oz (84.5 kg)   12/19/17 195 lb (88.5 kg)   11/28/17 196 lb (88.9 kg)              Today, you had the following     No orders found for display         Today's Medication Changes          These changes are accurate as of 2/26/18  4:05 PM.  If you have any questions, ask your nurse or doctor.               Start taking these medicines.        Dose/Directions    gentamicin 0.3 % ophthalmic solution   Commonly known as:  GARAMYCIN   Used for:  Acute conjunctivitis of left eye, unspecified acute conjunctivitis type   Started by:  Little Barnes NP        Dose:  1 drop   Place 1 drop Into the left eye 4 times daily for 7 days   Quantity:  2 mL   Refills:  0            Where to get your medicines      These medications were sent to [facility] [address] [address]     Phone:  [phone]     gentamicin 0.3 % ophthalmic solution                Primary Care Provider Office Phone # Fax #    Cece De La Cruz [phone]       [address] [facility] [address]        Equal Access to Services     NEMESIO TREVINO AH: Hadii bro[place]lyn borgeso Sotami, waaxda luqadaha, qaybta kaalmada adeegyada, chirag morton. So [facility] [phone].    ATENCIÓN: Si habla español, tiene a clemens disposición servicios gratuitos de asistencia lingüística. Nayan al [phone].    We comply with applicable federal civil rights laws and [place] laws. We do not discriminate on the basis of race, color, national origin, age, disability, sex, sexual orientation, or gender identity.            Thank you!     Thank you for choosing [facility] [place]  for your care. Our goal is always to provide you with excellent care. Hearing back from our patients is one way we can continue to improve our services. Please take a few minutes to  complete the written survey that you may receive in the mail after your visit with us. Thank you!             Your Updated Medication List - Protect others around you: Learn how to safely use, store and throw away your medicines at www.disposemymeds.org.          This list is accurate as of 2/26/18  4:05 PM.  Always use your most recent med list.                   Brand Name Dispense Instructions for use Diagnosis    amitriptyline 10 MG tablet    ELAVIL     Take 1 tablet by mouth At Bedtime For 2 weeks, then increase to 2 tablets at bedtime        gentamicin 0.3 % ophthalmic solution    GARAMYCIN    2 mL    Place 1 drop Into the left eye 4 times daily for 7 days    Acute conjunctivitis of left eye, unspecified acute conjunctivitis type       levothyroxine 50 MCG tablet    SYNTHROID/LEVOTHROID     Take 1 tablet by mouth every morning (before breakfast)        * lisinopril-hydrochlorothiazide 20-25 MG per tablet    PRINZIDE/ZESTORETIC     Take 1 tablet by mouth daily        * lisinopril-hydrochlorothiazide 20-25 MG per tablet    PRINZIDE/ZESTORETIC     Take 1 tablet by mouth daily        magnesium oxide 400 MG tablet    MAG-OX     Take 1 tablet by mouth daily        potassium chloride SA 20 MEQ CR tablet    K-DUR/KLOR-CON M     Take 1 tablet by mouth daily with food        rizatriptan 5 MG ODT tab    MAXALT-MLT     Place 1 tablet under the tongue 2 times daily as needed for migraine Give at minimum 2 hrs apart. Max Dose: 30 mg per 24 hrs        * Notice:  This list has 2 medication(s) that are the same as other medications prescribed for you. Read the directions carefully, and ask your doctor or other care provider to review them with you.

## 2018-03-04 ENCOUNTER — HEALTH MAINTENANCE LETTER (OUTPATIENT)
Age: 63
End: 2018-03-04

## 2018-04-09 ENCOUNTER — OFFICE VISIT (OUTPATIENT)
Dept: FAMILY MEDICINE | Facility: OTHER | Age: 63
End: 2018-04-09
Attending: FAMILY MEDICINE
Payer: COMMERCIAL

## 2018-04-09 VITALS
DIASTOLIC BLOOD PRESSURE: 74 MMHG | HEART RATE: 64 BPM | SYSTOLIC BLOOD PRESSURE: 122 MMHG | BODY MASS INDEX: 35.39 KG/M2 | WEIGHT: 190.4 LBS

## 2018-04-09 DIAGNOSIS — R21 RASH: Primary | ICD-10-CM

## 2018-04-09 DIAGNOSIS — L30.9 CHRONIC DERMATITIS OF HANDS: ICD-10-CM

## 2018-04-09 DIAGNOSIS — E03.9 HYPOTHYROIDISM, UNSPECIFIED TYPE: ICD-10-CM

## 2018-04-09 DIAGNOSIS — I10 BENIGN ESSENTIAL HYPERTENSION: ICD-10-CM

## 2018-04-09 DIAGNOSIS — E78.2 MIXED HYPERLIPIDEMIA: ICD-10-CM

## 2018-04-09 DIAGNOSIS — G43.009 MIGRAINE WITHOUT AURA AND WITHOUT STATUS MIGRAINOSUS, NOT INTRACTABLE: ICD-10-CM

## 2018-04-09 PROCEDURE — G0463 HOSPITAL OUTPT CLINIC VISIT: HCPCS

## 2018-04-09 PROCEDURE — 99214 OFFICE O/P EST MOD 30 MIN: CPT | Performed by: FAMILY MEDICINE

## 2018-04-09 RX ORDER — RIZATRIPTAN BENZOATE 5 MG/1
5 TABLET, ORALLY DISINTEGRATING ORAL 2 TIMES DAILY PRN
Qty: 30 TABLET | Status: CANCELLED | OUTPATIENT
Start: 2018-04-09

## 2018-04-09 RX ORDER — CLOTRIMAZOLE AND BETAMETHASONE DIPROPIONATE 10; .64 MG/G; MG/G
CREAM TOPICAL 2 TIMES DAILY
Qty: 15 G | Refills: 1 | Status: SHIPPED | OUTPATIENT
Start: 2018-04-09 | End: 2019-02-15

## 2018-04-09 RX ORDER — LISINOPRIL AND HYDROCHLOROTHIAZIDE 20; 25 MG/1; MG/1
1 TABLET ORAL DAILY
Qty: 90 TABLET | Refills: 3 | Status: SHIPPED | OUTPATIENT
Start: 2018-04-09 | End: 2018-11-30

## 2018-04-09 RX ORDER — LEVOTHYROXINE SODIUM 50 UG/1
50 TABLET ORAL
Qty: 90 TABLET | Refills: 1 | Status: SHIPPED | OUTPATIENT
Start: 2018-04-09 | End: 2018-11-30

## 2018-04-09 RX ORDER — POTASSIUM CHLORIDE 1500 MG/1
20 TABLET, EXTENDED RELEASE ORAL
Qty: 90 TABLET | Refills: 3 | Status: SHIPPED | OUTPATIENT
Start: 2018-04-09 | End: 2018-11-30

## 2018-04-09 ASSESSMENT — PAIN SCALES - GENERAL: PAINLEVEL: EXTREME PAIN (8)

## 2018-04-10 NOTE — PROGRESS NOTES
SUBJECTIVE  HPI:  Steffany Altamirano is a 62 year old female who presents to the clinic today for a rash.  Onset of rash was 1 year ago.  Location of the rash: hand.  Associated symptoms include: dry skin and flaking.  Symptoms appear to be worsening.  Therapies tried to improve the rash: OTC Topical Steroids .  Previous history of a similar rash? Yes:   Recent exposure history: none known    She also needs a refill multiple medications.  Reports her blood pressures been under fair control.  She denies any headache, dizziness, chest pain or shortness of breath.  She does not smoke.  She does not get any regular exercise.  Patient Active Problem List   Diagnosis     Chronic dermatitis of hands     Displacement of cervical intervertebral disc without myelopathy     Diverticulosis of sigmoid colon     Dyshidrotic eczema     Hyperlipidemia     Hypertension     Lipoma of other skin and subcutaneous tissue     Obesity     Plantar fasciitis, bilateral     Abnormal glucose     Sjogren's syndrome (H)     Umbilical hernia       Patient Active Problem List 04/09/2018 - [place] as Reviewed 04/09/2018   -- ADHESIVE TAPE --  -- noted 08/08/2013   -- LIQUID ADHESIVE --  -- noted 02/11/2013      Current Outpatient Prescriptions   Medication Sig Dispense Refill     levothyroxine (SYNTHROID/LEVOTHROID) 50 MCG tablet Take 1 tablet (50 mcg) by mouth every morning (before breakfast) 90 tablet 1     lisinopril-hydrochlorothiazide (PRINZIDE/ZESTORETIC) 20-25 MG per tablet Take 1 tablet by mouth daily 90 tablet 3     potassium chloride SA (K-DUR/KLOR-CON M) 20 MEQ CR tablet Take 1 tablet (20 mEq) by mouth daily with food 90 tablet 3     clotrimazole-betamethasone (LOTRISONE) cream Apply topically 2 times daily 15 g 1     magnesium oxide (MAG-OX) 400 MG tablet Take 1 tablet by mouth daily       rizatriptan (MAXALT-MLT) 5 MG ODT tab Place 1 tablet under the tongue 2 times daily as needed for migraine Give at minimum 2 hrs apart. Max Dose: 30 mg  per 24 hrs         EXAM:   VITALS: /74 (BP Location: Right arm, Patient Position: Sitting, Cuff Size: Adult Regular)  Pulse 64  Wt 190 lb 6.4 oz (86.4 kg)  BMI 35.39 kg/m2  General:healthy, alert and no distress  Rash description:     Location: finger and hand     Distribution: localized and clustered     Lesion grouping: clustered     Lesion type: papular and scales on leading edge     Color: red scaley  Nail exam:NEGATIVE and normal- no clubbing or nail changes  Hair exam: NEGATIVE    PERTINENT EXAM: SKIN:     ASSESSMENT / IMPRESSION:  (R21) Rash  (primary encounter diagnosis)  Comment:   Plan: clotrimazole-betamethasone (LOTRISONE) cream            (L30.9) Chronic dermatitis of hands  Comment:   Plan:     (E78.2) Mixed hyperlipidemia  Comment:   Plan:     (E03.9) Hypothyroidism, unspecified type  Comment:   Plan: levothyroxine (SYNTHROID/LEVOTHROID) 50 MCG         tablet            (I10) Benign essential hypertension  Comment:   Plan: lisinopril-hydrochlorothiazide         (PRINZIDE/ZESTORETIC) 20-25 MG per tablet,         potassium chloride SA (K-DUR/KLOR-CON M) 20 MEQ        CR tablet            (G43.009) Migraine without aura and without status migrainosus, not intractable  Comment:   Plan:       PLAN:  1) Patient education [place] [facility]  2) See todays orders.  3) Follow-up Follow up in 1 month.  Patient will continue above medications.  Refills are provided.  Recommend Lotrisone cream applied to rash twice daily for the next 2 weeks.  If no improvement willThere are no Patient Instructions on file for this visit.    Have her seen by dermatology.  Cece Freeman MD

## 2018-06-08 NOTE — NURSING NOTE
Patient here for rash on hands. Has been seen by Derm for this in the past. Current rash for about 2 months.   Alyse Espinoza LPN ..........4/9/2018 12:32 PM   
DISPLAY PLAN FREE TEXT

## 2018-07-23 NOTE — PROGRESS NOTES
Patient Information     Patient Name  Steffany Altamirano MRN  [id number] Sex  Female   1955      Letter by Cece Freeman MD at      Author:  Cece Freeman MD Service:  (none) Author Type:  (none)    Filed:   Encounter Date:  10/12/2017 Status:  (Other)           Steffany Altamirano  [address] [facility] [address]          2017    Dear Ms. Altamirano:      Enclosed is a copy of recent lab results. Cholesterol is slightly above goal. Thyroid function is normal.    Please call with concerns.    Sincerely,  Cece De La Cruz MD     Results for orders placed or performed in visit on 10/12/17       BASIC METABOLIC PANEL       Result  Value Ref Range Status    SODIUM 140 133 - 143 mmol/L Final    POTASSIUM 3.5 3.5 - 5.1 mmol/L Final    CHLORIDE 105 98 - 107 mmol/L Final    CO2,TOTAL 28 21 - 31 mmol/L Final    ANION GAP 7 5 - 18                 Final    GLUCOSE 86 70 - 105 mg/dL Final    CALCIUM 9.5 8.6 - 10.3 mg/dL Final    BUN 9 7 - 25 mg/dL Final    CREATININE 0.74 0.70 - 1.30 mg/dL Final    BUN/CREAT RATIO           12                 Final    GFR if African American >60 >60 ml/min/1.73m2 Final    GFR if not African American >60 >60 ml/min/1.73m2 Final   MAGNESIUM       Result  Value Ref Range Status    MAGNESIUM 2.1 1.9 - 2.7 mg/dL Final   TSH       Result  Value Ref Range Status    TSH 3.27 0.34 - 5.60 uIU/mL Final   LIPID PANEL       Result  Value Ref Range Status    CHOLESTEROL,TOTAL 233 (H) <200 mg/dL Final    TRIGLYCERIDES 125 <150 mg/dL Final    HDL CHOLESTEROL 66 23 - 92 mg/dL Final    NON-HDL CHOLESTEROL 167 (H) <145 mg/dl Final    CHOL/HDL RATIO            3.53 <4.50                 Final    LDL CHOLESTEROL 142 (H) <100 mg/dL Final    PROVIDER ORDERED STATUS FASTING  Final

## 2018-07-24 NOTE — PROGRESS NOTES
Patient Information     Patient Name  Steffany Altamirano MRN  [id number] Sex  Female   1955      Letter by Franck Garcia MD at      Author:  Franck Garcia MD Service:  (none) Author Type:  (none)    Filed:   Date of Service:   Status:  (Other)       [facility]  [address] [facility] [address]  [phone]         Steffany Altamirano   [address] [facility] [address]      2017  Date of Breast Imagin2017  9:38 AM    Dear Ms. Altamirano:    We are pleased to inform you that the result of your recent breast imaging examination is normal/benign (not cancer).    A report of your results was sent to your health care provider(s).    Your images will become part of your medical file here at [facility] and will be available for your continuing care. You are responsible for informing any new health care provider or breast imaging facility of the date and location of this examination.    Although mammography is the most accurate method for early detection, not all cancers are found through mammography. If you notice any new changes in your breast(s) please inform your health care provider without delay.    Thank you for choosing [facility] to participate in your healthcare needs.         [facility] Recommendations for Early Breast Cancer Detection   in Women without Symptoms  When to start having mammograms to screen for breast cancer, and how often to have them, is a personal decision. It should be based on your preferences, your values and your risk for developing breast cancer. [facility] recommends that you and your health care provider together determine when mammograms are right for you.    [facility] recommends the following guidelines for women who have an average risk for breast cancer, based on American Cancer Society guidelines:    Age 40 to 44:  Mammograms are optional.     Age 45 to 54: Have a mammogram every year.           Age 55 and older: Have a mammogram every year, or transition to having one every 2 years. Continue to have mammograms as long as your health is good.    If you have a higher than average risk for breast cancer, your health care provider may recommend a different schedule.

## 2018-07-24 NOTE — PROGRESS NOTES
Patient Information     Patient Name  Steffany Altamirano MRN  [id number] Sex  Female   1955      Letter by Bc Gustafson MD at      Author:  Bc Gustafson MD Service:  (none) Author Type:  (none)    Filed:   Encounter Date:  2017 Status:  (Other)           Steffany Altamirano  [address] [facility] [address]          May 2, 2017    Dear Ms. Altamirano:    Following are the tests completed during your last clinic visit.  The results of these tests are normal and require no further attention unless otherwise noted.    Results for orders placed or performed in visit on 17      BASIC METABOLIC PANEL      Result  Value Ref Range    SODIUM 134 133 - 143 mmol/L    POTASSIUM 4.2 3.5 - 5.1 mmol/L    CHLORIDE 99 98 - 107 mmol/L    CO2,TOTAL 29 21 - 31 mmol/L    ANION GAP 6 5 - 18                    GLUCOSE 101 70 - 105 mg/dL    CALCIUM 10.1 8.6 - 10.3 mg/dL    BUN 17 7 - 25 mg/dL    CREATININE 0.87 0.70 - 1.30 mg/dL    BUN/CREAT RATIO           20                    GFR if African American >60 >60 ml/min/1.73m2    GFR if not African American >60 >60 ml/min/1.73m2   TSH      Result  Value Ref Range    TSH 2.29 0.34 - 5.60 uIU/mL         If you have any further questions or problems contact my office at  [phone].    Thank you,    Bc Gustafson MD

## 2018-11-29 ENCOUNTER — OFFICE VISIT (OUTPATIENT)
Dept: FAMILY MEDICINE | Facility: OTHER | Age: 63
End: 2018-11-29
Attending: FAMILY MEDICINE
Payer: COMMERCIAL

## 2018-11-29 VITALS
DIASTOLIC BLOOD PRESSURE: 74 MMHG | TEMPERATURE: 97.7 F | SYSTOLIC BLOOD PRESSURE: 127 MMHG | HEART RATE: 63 BPM | BODY MASS INDEX: 36.66 KG/M2 | RESPIRATION RATE: 18 BRPM | WEIGHT: 197.2 LBS

## 2018-11-29 DIAGNOSIS — E03.9 HYPOTHYROIDISM, UNSPECIFIED TYPE: ICD-10-CM

## 2018-11-29 DIAGNOSIS — E66.01 MORBID OBESITY (H): ICD-10-CM

## 2018-11-29 DIAGNOSIS — E78.2 MIXED HYPERLIPIDEMIA: ICD-10-CM

## 2018-11-29 DIAGNOSIS — Z23 NEED FOR PROPHYLACTIC VACCINATION AND INOCULATION AGAINST INFLUENZA: ICD-10-CM

## 2018-11-29 DIAGNOSIS — L30.9 ECZEMA, UNSPECIFIED TYPE: Primary | ICD-10-CM

## 2018-11-29 LAB
ALBUMIN SERPL-MCNC: 4 G/DL (ref 3.5–5.7)
ALP SERPL-CCNC: 61 U/L (ref 34–104)
ALT SERPL W P-5'-P-CCNC: 17 U/L (ref 7–52)
ANION GAP SERPL CALCULATED.3IONS-SCNC: 8 MMOL/L (ref 3–14)
AST SERPL W P-5'-P-CCNC: 15 U/L (ref 13–39)
BILIRUB SERPL-MCNC: 0.4 MG/DL (ref 0.3–1)
BUN SERPL-MCNC: 16 MG/DL (ref 7–25)
CALCIUM SERPL-MCNC: 9.9 MG/DL (ref 8.6–10.3)
CHLORIDE SERPL-SCNC: 103 MMOL/L (ref 98–107)
CHOLEST SERPL-MCNC: 243 MG/DL
CO2 SERPL-SCNC: 28 MMOL/L (ref 21–31)
CREAT SERPL-MCNC: 0.84 MG/DL (ref 0.6–1.2)
GFR SERPL CREATININE-BSD FRML MDRD: 68 ML/MIN/1.7M2
GLUCOSE SERPL-MCNC: 95 MG/DL (ref 70–105)
HDLC SERPL-MCNC: 78 MG/DL (ref 23–92)
LDLC SERPL CALC-MCNC: 132 MG/DL
NONHDLC SERPL-MCNC: 165 MG/DL
POTASSIUM SERPL-SCNC: 3.6 MMOL/L (ref 3.5–5.1)
PROT SERPL-MCNC: 7.2 G/DL (ref 6.4–8.9)
SODIUM SERPL-SCNC: 139 MMOL/L (ref 134–144)
TRIGL SERPL-MCNC: 167 MG/DL
TSH SERPL DL<=0.05 MIU/L-ACNC: 4.28 IU/ML (ref 0.34–5.6)

## 2018-11-29 PROCEDURE — 90471 IMMUNIZATION ADMIN: CPT

## 2018-11-29 PROCEDURE — 80061 LIPID PANEL: CPT | Performed by: FAMILY MEDICINE

## 2018-11-29 PROCEDURE — 80053 COMPREHEN METABOLIC PANEL: CPT | Performed by: FAMILY MEDICINE

## 2018-11-29 PROCEDURE — 90686 IIV4 VACC NO PRSV 0.5 ML IM: CPT | Performed by: FAMILY MEDICINE

## 2018-11-29 PROCEDURE — G0463 HOSPITAL OUTPT CLINIC VISIT: HCPCS

## 2018-11-29 PROCEDURE — 84443 ASSAY THYROID STIM HORMONE: CPT | Performed by: FAMILY MEDICINE

## 2018-11-29 PROCEDURE — 36415 COLL VENOUS BLD VENIPUNCTURE: CPT | Performed by: FAMILY MEDICINE

## 2018-11-29 PROCEDURE — 99214 OFFICE O/P EST MOD 30 MIN: CPT | Performed by: FAMILY MEDICINE

## 2018-11-29 PROCEDURE — 90471 IMMUNIZATION ADMIN: CPT | Performed by: FAMILY MEDICINE

## 2018-11-29 PROCEDURE — G0463 HOSPITAL OUTPT CLINIC VISIT: HCPCS | Mod: 25

## 2018-11-29 RX ORDER — TRIAMCINOLONE ACETONIDE 1 MG/G
CREAM TOPICAL
Qty: 30 G | Refills: 0 | Status: SHIPPED | OUTPATIENT
Start: 2018-11-29 | End: 2018-12-18

## 2018-11-29 ASSESSMENT — PAIN SCALES - GENERAL: PAINLEVEL: EXTREME PAIN (9)

## 2018-11-29 NOTE — MR AVS SNAPSHOT
After Visit Summary   11/29/2018    Steffany Altamirano    MRN: [id number]           Patient Information     Date Of Birth          1955        Visit Information        Provider Department      11/29/2018 4:00 PM Cece Dozier MD [facility]        Today's Diagnoses     Eczema, unspecified type    -  1    Morbid obesity (H)          Care Instructions    Apply steroid cream 3 x day x 2 weeks    Then apply Eucerin  Cream on top of it 2 x day, continue long term (winter months)            Follow-ups after your visit        Your next 10 appointments already scheduled     Nov 30, 2018 10:45 AM CST   (Arrive by 10:30 AM)   MA SCREENING BILATERAL W/ PERI with GHMA2   [facility] ([facility])    [address]  [facility] [id number] [phone]           How do I prepare for my exam? (Food and drink instructions) No Food and Drink Restrictions.  How do I prepare for my exam? (Other instructions) Do not use any powder, lotion or deodorant under your arms or on your breast. If you do, we will ask you to remove it before your exam.  What should I wear: Wear comfortable, two-piece clothing.  How long does the exam take: Most scans will take 15 minutes.  What should I bring: Bring any previous mammograms from other facilities or have them mailed to the breast center.  Do I need a :  No  is needed.  What do I need to tell my doctor: If you have any allergies, tell your care team.  What should I do after the exam: No restrictions, You may resume normal activities.  What is this test: This test is an x-ray of the breast to look for breast disease. The breast is pressed between two plates to flatten and spread the tissue. An X-ray is taken of the breast from different angles.  Who should I call with questions: If you have any questions, please call the Imaging Department where you will have your exam. Directions, parking  "instructions, and other information is available on our website, [email or web address].  Other information about my exam Three-dimensional (3D) mammograms are available at [facility] locations in [facility], [facility], [facility], [place], and [place]. [facility] locations include [place] and the [facility] in [place].  Benefits of 3D mammograms include: * Improved rate of cancer detection * Decreases your chance of having to go back for more tests, which means fewer: * \"False-positive\" results (This means that there is an abnormal area but it isn't cancer.) * Invasive testing procedures, such as a biopsy or surgery * Can provide clearer images of the breast if you have dense breast tissue.  *3D mammography is an optional exam that anyone can have with a 2D mammogram. It doesn't replace or take the place of a 2D mammogram. 2D mammograms remain an effective screening test for all women.  Not all insurance companies cover the cost of a 3D mammogram. Check with your insurance.              Who to contact     If you have questions or need follow up information about today's clinic visit or your schedule please contact [facility] directly at [phone].  Normal or non-critical lab and imaging results will be communicated to you by MyChart, letter or phone within 4 business days after the clinic has received the results. If you do not hear from us within 7 days, please contact the clinic through MyChart or phone. If you have a critical or abnormal lab result, we will notify you by phone as soon as possible.  Submit refill requests through [employer] or call your pharmacy and they will forward the refill request to us. Please allow 3 business days for your refill to be completed.          Additional Information About Your Visit        Care EveryWhere ID     This is your Care EveryWhere ID. This could be used by other organizations to access your [facility] medical " records  [id number]        Your Vitals Were     Pulse Temperature Respirations Breastfeeding? BMI (Body Mass Index)       63 97.7  F (36.5  C) (Oral) 18 No 36.66 kg/m2        Blood Pressure from Last 3 Encounters:   11/29/18 127/74   04/09/18 122/74   02/26/18 128/80    Weight from Last 3 Encounters:   11/29/18 197 lb 3.2 oz (89.4 kg)   04/09/18 190 lb 6.4 oz (86.4 kg)   02/26/18 186 lb 5 oz (84.5 kg)              Today, you had the following     No orders found for display         Today's Medication Changes          These changes are accurate as of 11/29/18  4:00 PM.  If you have any questions, ask your nurse or doctor.               Start taking these medicines.        Dose/Directions    triamcinolone 0.1 % external cream   Commonly known as:  KENALOG   Used for:  Eczema, unspecified type   Started by:  Cece Dozier MD        Apply sparingly to affected area three times daily for 14 days.   Quantity:  30 g   Refills:  0            Where to get your medicines      These medications were sent to [facility] [address] [address]     Phone:  [phone]     triamcinolone 0.1 % external cream                Primary Care Provider Office Phone # Fax #    Cece De La Cruz [phone]       [address] [facility] [address]        Equal Access to Services     [facility]: Hadii [place] ku hadasho Soomaali, waaxda luqadaha, qaybta kaalmada adeegyada, chirag morton. So [facility] [phone].    ATENCIÓN: Si habla matthewañol, tiene a clemens disposición servicios gratuitos de asistencia lingüística. Llame al [phone].    We comply with applicable federal civil rights laws and [place] laws. We do not discriminate on the basis of race, color, national origin, age, disability, sex, sexual orientation, or gender identity.            Thank you!     Thank you for choosing  [facility]  for your care. Our goal is always to provide you with excellent care. Hearing back from our patients is one way we can continue to improve our services. Please take a few minutes to complete the written survey that you may receive in the mail after your visit with us. Thank you!             Your Updated Medication List - Protect others around you: Learn how to safely use, store and throw away your medicines at www.disposemymeds.org.          This list is accurate as of 11/29/18  4:00 PM.  Always use your most recent med list.                   Brand Name Dispense Instructions for use Diagnosis    clotrimazole-betamethasone 1-0.05 % external cream    LOTRISONE    15 g    Apply topically 2 times daily    Rash       levothyroxine 50 MCG tablet    SYNTHROID/LEVOTHROID    90 tablet    Take 1 tablet (50 mcg) by mouth every morning (before breakfast)    Hypothyroidism, unspecified type       lisinopril-hydrochlorothiazide 20-25 MG tablet    PRINZIDE/ZESTORETIC    90 tablet    Take 1 tablet by mouth daily    Benign essential hypertension       magnesium oxide 400 MG tablet    MAG-OX     Take 1 tablet by mouth daily        potassium chloride ER 20 MEQ CR tablet    K-DUR/KLOR-CON M    90 tablet    Take 1 tablet (20 mEq) by mouth daily with food    Benign essential hypertension       rizatriptan 5 MG ODT    MAXALT-MLT     Place 1 tablet under the tongue 2 times daily as needed for migraine Give at minimum 2 hrs apart. Max Dose: 30 mg per 24 hrs        triamcinolone 0.1 % external cream    KENALOG    30 g    Apply sparingly to affected area three times daily for 14 days.    Eczema, unspecified type

## 2018-11-29 NOTE — PROGRESS NOTES
Injectable Influenza Immunization Documentation    1.  Is the person to be vaccinated sick today?   No    2. Does the person to be vaccinated have an allergy to a component   of the vaccine?   No  Egg Allergy Algorithm Link    3. Has the person to be vaccinated ever had a serious reaction   to influenza vaccine in the past?   No    4. Has the person to be vaccinated ever had Guillain-Barré syndrome?   No    Form completed by Rylie Hein LPN .............11/29/2018     4:14 PM    Prior to injection verified patient identity using patient's name and date of birth.  Due to injection administration, patient instructed to remain in clinic for 15 minutes  afterwards, and to report any adverse reaction to me immediately.

## 2018-11-29 NOTE — NURSING NOTE
Patient is here for concerns of swelling and redness and burning on right hand. States is same as before, but is worse then last time. States went away during the summer.  Rylie Hein LPN .............11/29/2018     3:49 PM      No LMP recorded. Patient has had a hysterectomy.  Medication Reconciliation: complete    Rylie Hein LPN  11/29/2018 3:54 PM

## 2018-11-29 NOTE — PATIENT INSTRUCTIONS
Apply steroid cream 3 x day x 2 weeks    Then apply Eucerin  Cream on top of it 2 x day, continue long term (winter months)

## 2018-11-30 ENCOUNTER — HOSPITAL ENCOUNTER (OUTPATIENT)
Dept: MAMMOGRAPHY | Facility: OTHER | Age: 63
Discharge: HOME | End: 2018-11-30
Attending: FAMILY MEDICINE | Admitting: FAMILY MEDICINE
Payer: COMMERCIAL

## 2018-11-30 DIAGNOSIS — I10 BENIGN ESSENTIAL HYPERTENSION: ICD-10-CM

## 2018-11-30 DIAGNOSIS — E03.9 HYPOTHYROIDISM, UNSPECIFIED TYPE: ICD-10-CM

## 2018-11-30 DIAGNOSIS — G43.009 MIGRAINE WITHOUT AURA AND WITHOUT STATUS MIGRAINOSUS, NOT INTRACTABLE: Primary | ICD-10-CM

## 2018-11-30 DIAGNOSIS — Z12.39 SCREENING BREAST EXAMINATION: ICD-10-CM

## 2018-11-30 PROCEDURE — 77063 BREAST TOMOSYNTHESIS BI: CPT

## 2018-11-30 PROCEDURE — 77067 SCR MAMMO BI INCL CAD: CPT

## 2018-11-30 RX ORDER — POTASSIUM CHLORIDE 1500 MG/1
20 TABLET, EXTENDED RELEASE ORAL
Qty: 90 TABLET | Refills: 3 | Status: SHIPPED | OUTPATIENT
Start: 2018-11-30 | End: 2020-02-04

## 2018-11-30 RX ORDER — LEVOTHYROXINE SODIUM 50 UG/1
50 TABLET ORAL
Qty: 90 TABLET | Refills: 3 | Status: SHIPPED | OUTPATIENT
Start: 2018-11-30 | End: 2020-02-04

## 2018-11-30 RX ORDER — LISINOPRIL AND HYDROCHLOROTHIAZIDE 20; 25 MG/1; MG/1
1 TABLET ORAL DAILY
Qty: 90 TABLET | Refills: 3 | Status: SHIPPED | OUTPATIENT
Start: 2018-11-30 | End: 2020-02-04

## 2018-11-30 RX ORDER — RIZATRIPTAN BENZOATE 5 MG/1
5 TABLET, ORALLY DISINTEGRATING ORAL 2 TIMES DAILY PRN
Qty: 30 TABLET | Refills: 3 | Status: SHIPPED | OUTPATIENT
Start: 2018-11-30 | End: 2020-02-04

## 2018-11-30 NOTE — TELEPHONE ENCOUNTER
----- Message from Cece De La Cruz MD sent at 11/30/2018  1:58 PM CST -----  Call pt, should resume medications  Cece Freeman MD

## 2018-12-04 NOTE — PROGRESS NOTES
SUBJECTIVE:   Steffany Altamirano is a 63 year old female who presents to clinic today for the following health issues:  Nursing Notes:   Rylie Hein LPN  11/29/2018  3:59 PM  Signed  Patient is here for concerns of swelling and redness and burning on right hand. States is same as before, but is worse then last time. States went away during the summer.  Rylie Hein LPN .............11/29/2018     3:49 PM      No LMP recorded. Patient has had a hysterectomy.  Medication Reconciliation: complete    Rylie Hein LPN  11/29/2018 3:54 PM      HPI  63-year-old female presents with recurrent rash on her hands.  It occurs every winter.  She is not using any new lotions or skin care products.  She no longer is using the corticosteroid cream.    She has not been seen in over a year.  She is out of all of her chronic medications.  Reports she is just been too busy to come in given work and family obligations.        Patient Active Problem List    Diagnosis Date Noted     Obesity (BMI 35.0-39.9) with comorbidity (H) 11/29/2018     Priority: Medium     Displacement of cervical intervertebral disc without myelopathy 01/24/2018     Priority: Medium     Hyperlipidemia 01/24/2018     Priority: Medium     Hypertension 01/24/2018     Priority: Medium     Obesity 01/24/2018     Priority: Medium     Chronic dermatitis of hands 03/24/2016     Priority: Medium     Diverticulosis of sigmoid colon 01/20/2014     Priority: Medium     Lipoma of other skin and subcutaneous tissue 01/29/2013     Priority: Medium     Dyshidrotic eczema 06/22/2012     Priority: Medium     Abnormal glucose 03/15/2012     Priority: Medium     Umbilical hernia 02/27/2012     Priority: Medium     Sjogren's syndrome (H) 01/26/2012     Priority: Medium     Plantar fasciitis, bilateral 10/21/2010     Priority: Medium     Past Medical History:   Diagnosis Date     Essential (primary) hypertension     Hypertension     Prediabetes     No Comments Provided      Sicca syndrome (H)     Possible Sjogren's      Past Surgical History:   Procedure Laterality Date     ARTHROSCOPY SHOULDER ROTATOR CUFF REPAIR Right 1998    Dr. Sweet      SECTION  1972    1973,Epigastric hernia repair by [facility]     CHOLECYSTECTOMY  1984     COLONOSCOPY  2014    F/U      HERNIA REPAIR  10/25/2002    Epigastric hernia repair by [facility]     HYSTERECTOMY TOTAL ABDOMINAL, BILATERAL SALPINGO-OOPHORECTOMY, COMBINED  1996    Supracervical hysterectomy and bilateral oophorectomy     LAPAROSCOPIC TUBAL LIGATION            Resection of lipoma of LUQ  [id number]            Review of Systems   See hPI  OBJECTIVE:     /74 (BP Location: Right arm, Patient Position: Sitting, Cuff Size: Adult Large)  Pulse 63  Temp 97.7  F (36.5  C) (Oral)  Resp 18  Wt 197 lb 3.2 oz (89.4 kg)  Breastfeeding? No  BMI 36.66 kg/m2  Body mass index is 36.66 kg/(m^2).  Physical Exam   Constitutional: She appears well-developed.   Eyes: Conjunctivae are normal.   Neck: No thyromegaly present.   Cardiovascular: Normal rate and regular rhythm.    Pulmonary/Chest: Effort normal and breath sounds normal. No respiratory distress.   Lymphadenopathy:     She has no cervical adenopathy.   Skin: Rash noted.       Diagnostic Test Results:  Results for orders placed or performed in visit on 18   TSH   Result Value Ref Range    Thyrotropin 4.28 0.34 - 5.60 IU/mL   Comprehensive metabolic panel   Result Value Ref Range    Sodium 139 134 - 144 mmol/L    Potassium 3.6 3.5 - 5.1 mmol/L    Chloride 103 98 - 107 mmol/L    Carbon Dioxide 28 21 - 31 mmol/L    Anion Gap 8 3 - 14 mmol/L    Glucose 95 70 - 105 mg/dL    Urea Nitrogen 16 7 - 25 mg/dL    Creatinine 0.84 0.60 - 1.20 mg/dL    GFR Estimate 68 >60 mL/min/1.7m2    GFR Estimate If Black 83 >60 mL/min/1.7m2    Calcium 9.9 8.6 - 10.3 mg/dL    Bilirubin Total 0.4 0.3 - 1.0 mg/dL    Albumin 4.0 3.5 - 5.7 g/dL    Protein Total 7.2 6.4 - 8.9 g/dL    Alkaline Phosphatase 61 34 -  104 U/L    ALT 17 7 - 52 U/L    AST 15 13 - 39 U/L   Lipid Profile   Result Value Ref Range    Cholesterol 243 (H) <200 mg/dL    Triglycerides 167 (H) <150 mg/dL    HDL Cholesterol 78 23 - 92 mg/dL    LDL Cholesterol Calculated 132 (H) <100 mg/dL    Non HDL Cholesterol 165 (H) <130 mg/dL       ASSESSMENT/PLAN:           ICD-10-CM    1. Eczema, unspecified type L30.9 triamcinolone (KENALOG) 0.1 % external cream   2. Morbid obesity (H) E66.01    3. Mixed hyperlipidemia E78.2 Comprehensive metabolic panel     Lipid Profile     Comprehensive metabolic panel     Lipid Profile   4. Hypothyroidism, unspecified type E03.9 TSH     TSH   5. Need for prophylactic vaccination and inoculation against influenza Z23 HC FLU VAC PRESRV FREE QUAD SPLIT VIR 3+YRS IM     Vaccine Administration, Initial [51410]       Discussed the chronic nature of eczema.  Recommend Eucerin cream as her baseline treatment.  May intermittently/pulse dose triamcinolone cream.  Avoid alcohol based hand 's.    Patient will be resumed on her chronic medications.  Flu vaccine was provided.  Patient Instructions   Apply steroid cream 3 x day x 2 weeks    Then apply Eucerin  Cream on top of it 2 x day, continue long term (winter months)    I spent over 25 minutes with the patient, greater than 50% was spent in counseling and coordination of care.        MD Cece Mack MD [facility]

## 2018-12-11 ENCOUNTER — TELEPHONE (OUTPATIENT)
Dept: FAMILY MEDICINE | Facility: OTHER | Age: 63
End: 2018-12-11

## 2018-12-11 NOTE — TELEPHONE ENCOUNTER
Hand is not any better than it was 3 weeks ago. It;s more red and puffy.The Salve is not helping. Pharmacy [facility].

## 2018-12-13 NOTE — TELEPHONE ENCOUNTER
If it is red and swollen, might be infected now. Should make appt or if she can take picture and include on [employer] message?    Cece Freeman MD

## 2018-12-14 NOTE — TELEPHONE ENCOUNTER
Patient notified. States is still bothering her. Is red and itchy, a little puffy. Patient wants appt with typ, if gets worse will go to [facility] over weekend. Transferred to schedule for Tuesday.   Rylie Hein LPN .............12/14/2018     2:27 PM

## 2018-12-18 ENCOUNTER — OFFICE VISIT (OUTPATIENT)
Dept: FAMILY MEDICINE | Facility: OTHER | Age: 63
End: 2018-12-18
Attending: FAMILY MEDICINE
Payer: COMMERCIAL

## 2018-12-18 VITALS
HEART RATE: 66 BPM | BODY MASS INDEX: 36.58 KG/M2 | DIASTOLIC BLOOD PRESSURE: 60 MMHG | SYSTOLIC BLOOD PRESSURE: 108 MMHG | WEIGHT: 196.8 LBS | RESPIRATION RATE: 18 BRPM | TEMPERATURE: 97.5 F

## 2018-12-18 DIAGNOSIS — L30.9 ECZEMA, UNSPECIFIED TYPE: Primary | ICD-10-CM

## 2018-12-18 PROCEDURE — G0463 HOSPITAL OUTPT CLINIC VISIT: HCPCS

## 2018-12-18 PROCEDURE — 96372 THER/PROPH/DIAG INJ SC/IM: CPT

## 2018-12-18 PROCEDURE — 99213 OFFICE O/P EST LOW 20 MIN: CPT | Performed by: FAMILY MEDICINE

## 2018-12-18 PROCEDURE — 25000128 H RX IP 250 OP 636: Performed by: FAMILY MEDICINE

## 2018-12-18 PROCEDURE — G0463 HOSPITAL OUTPT CLINIC VISIT: HCPCS | Mod: 25

## 2018-12-18 RX ORDER — HYDROCORTISONE VALERATE 2 MG/G
OINTMENT TOPICAL 2 TIMES DAILY
Qty: 45 G | Refills: 0 | Status: SHIPPED | OUTPATIENT
Start: 2018-12-18 | End: 2019-02-15

## 2018-12-18 RX ORDER — METHYLPREDNISOLONE SODIUM SUCCINATE 40 MG/ML
40 INJECTION, POWDER, LYOPHILIZED, FOR SOLUTION INTRAMUSCULAR; INTRAVENOUS ONCE
Status: COMPLETED | OUTPATIENT
Start: 2018-12-18 | End: 2018-12-18

## 2018-12-18 RX ADMIN — METHYLPREDNISOLONE SODIUM SUCCINATE 40 MG: 125 INJECTION, POWDER, FOR SOLUTION INTRAMUSCULAR; INTRAVENOUS at 09:16

## 2018-12-18 ASSESSMENT — PAIN SCALES - GENERAL: PAINLEVEL: EXTREME PAIN (9)

## 2018-12-18 NOTE — NURSING NOTE
Patient is here for ongoing hand problem, has a rash on right hand.   Rylie Hein LPN .............12/18/2018     8:41 AM        No LMP recorded. Patient has had a hysterectomy.  Medication Reconciliation: complete    Rylie Hein LPN  12/18/2018 8:45 AM

## 2018-12-18 NOTE — PROGRESS NOTES
Prior to injection, verified patient identity using patient's name and date of birth.  Due to injection administration, patient instructed to remain in clinic for 15 minutes  afterwards, and to report any adverse reaction to me immediately.    solu-medrol    Drug Amount Wasted:  85 mg  Vial/Syringe: Single dose vial

## 2018-12-18 NOTE — PATIENT INSTRUCTIONS
Stop triamcinolone cream  Start westcort ointment, apply at bedtime and wear silk gloves    Stop eucerin janis 2 weeks then resume as daily use    Try tylenol PM 1 tab 1 hour prior to bedtime    Office Visit on 11/29/2018   Component Date Value Ref Range Status     Thyrotropin 11/29/2018 4.28  0.34 - 5.60 IU/mL Final     Sodium 11/29/2018 139  134 - 144 mmol/L Final     Potassium 11/29/2018 3.6  3.5 - 5.1 mmol/L Final     Chloride 11/29/2018 103  98 - 107 mmol/L Final     Carbon Dioxide 11/29/2018 28  21 - 31 mmol/L Final     Anion Gap 11/29/2018 8  3 - 14 mmol/L Final     Glucose 11/29/2018 95  70 - 105 mg/dL Final     Urea Nitrogen 11/29/2018 16  7 - 25 mg/dL Final     Creatinine 11/29/2018 0.84  0.60 - 1.20 mg/dL Final     GFR Estimate 11/29/2018 68  >60 mL/min/1.7m2 Final     GFR Estimate If Black 11/29/2018 83  >60 mL/min/1.7m2 Final     Calcium 11/29/2018 9.9  8.6 - 10.3 mg/dL Final     Bilirubin Total 11/29/2018 0.4  0.3 - 1.0 mg/dL Final     Albumin 11/29/2018 4.0  3.5 - 5.7 g/dL Final     Protein Total 11/29/2018 7.2  6.4 - 8.9 g/dL Final     Alkaline Phosphatase 11/29/2018 61  34 - 104 U/L Final     ALT 11/29/2018 17  7 - 52 U/L Final     AST 11/29/2018 15  13 - 39 U/L Final     Cholesterol 11/29/2018 243* <200 mg/dL Final     Triglycerides 11/29/2018 167* <150 mg/dL Final    Comment: Borderline high:  150-199 mg/dl  High:             200-499 mg/dl  Very high:       >499 mg/dl       HDL Cholesterol 11/29/2018 78  23 - 92 mg/dL Final     LDL Cholesterol Calculated 11/29/2018 132* <100 mg/dL Final    Comment: Above desirable:  100-129 mg/dl  Borderline High:  130-159 mg/dL  High:             160-189 mg/dL  Very high:       >189 mg/dl       Non HDL Cholesterol 11/29/2018 165* <130 mg/dL Final    Comment: Above Desirable:  130-159 mg/dl  Borderline high:  160-189 mg/dl  High:             190-219 mg/dl  Very high:       >219 mg/dl

## 2018-12-20 NOTE — PROGRESS NOTES
SUBJECTIVE:   Steffany Altamirano is a 63 year old female who presents to clinic today for the following health issues:  Nursing Notes:   Rylie Hein LPN  12/18/2018  8:57 AM  Signed  Patient is here for ongoing hand problem, has a rash on right hand.   Rylie Hein LPN .............12/18/2018     8:41 AM        No LMP recorded. Patient has had a hysterectomy.  Medication Reconciliation: complete    Rylie Hein LPN  12/18/2018 8:45 AM    HPI  63-year-old female presents with persistent rash.  This is been long-standing on the right hand.  It is worse during the wintertime.  Recently seen and prescribed potency cream.  She reports that it is more red and irritated.  She admits to picking at it.  She also uses rubber gloves on a fairly regular basis.    Patient Active Problem List    Diagnosis Date Noted     Obesity (BMI 35.0-39.9) with comorbidity (H) 11/29/2018     Priority: Medium     Displacement of cervical intervertebral disc without myelopathy 01/24/2018     Priority: Medium     Hyperlipidemia 01/24/2018     Priority: Medium     Hypertension 01/24/2018     Priority: Medium     Obesity 01/24/2018     Priority: Medium     Chronic dermatitis of hands 03/24/2016     Priority: Medium     Diverticulosis of sigmoid colon 01/20/2014     Priority: Medium     Lipoma of other skin and subcutaneous tissue 01/29/2013     Priority: Medium     Dyshidrotic eczema 06/22/2012     Priority: Medium     Abnormal glucose 03/15/2012     Priority: Medium     Umbilical hernia 02/27/2012     Priority: Medium     Sjogren's syndrome (H) 01/26/2012     Priority: Medium     Plantar fasciitis, bilateral 10/21/2010     Priority: Medium       Review of Systems     OBJECTIVE:     /60 (BP Location: Right arm, Patient Position: Sitting, Cuff Size: Adult Large)   Pulse 66   Temp 97.5  F (36.4  C) (Tympanic)   Resp 18   Wt 89.3 kg (196 lb 12.8 oz)   Breastfeeding? No   BMI 36.58 kg/m    Body mass index is 36.58  kg/m .  Physical Exam   Skin:   Rash on the right hand is  well demarcated today.  This is different compared to when I saw her earlier 2 weeks ago.  There is scattered papules and fissuring noted.  There is no warmth or swelling.           ASSESSMENT/PLAN:           ICD-10-CM    1. Eczema, unspecified type L30.9 hydrocortisone valerate (WEST-CARY) 0.2 % external ointment     methylPREDNISolone sodium succinate (solu-MEDROL) injection 40 mg       Patient Instructions     Stop triamcinolone cream  Start westcort ointment, apply at bedtime and wear silk gloves    Stop eucerin janis 2 weeks then resume as daily use    Try tylenol PM 1 tab 1 hour prior to bedtime    Office Visit on 11/29/2018   Component Date Value Ref Range Status     Thyrotropin 11/29/2018 4.28  0.34 - 5.60 IU/mL Final     Sodium 11/29/2018 139  134 - 144 mmol/L Final     Potassium 11/29/2018 3.6  3.5 - 5.1 mmol/L Final     Chloride 11/29/2018 103  98 - 107 mmol/L Final     Carbon Dioxide 11/29/2018 28  21 - 31 mmol/L Final     Anion Gap 11/29/2018 8  3 - 14 mmol/L Final     Glucose 11/29/2018 95  70 - 105 mg/dL Final     Urea Nitrogen 11/29/2018 16  7 - 25 mg/dL Final     Creatinine 11/29/2018 0.84  0.60 - 1.20 mg/dL Final     GFR Estimate 11/29/2018 68  >60 mL/min/1.7m2 Final     GFR Estimate If Black 11/29/2018 83  >60 mL/min/1.7m2 Final     Calcium 11/29/2018 9.9  8.6 - 10.3 mg/dL Final     Bilirubin Total 11/29/2018 0.4  0.3 - 1.0 mg/dL Final     Albumin 11/29/2018 4.0  3.5 - 5.7 g/dL Final     Protein Total 11/29/2018 7.2  6.4 - 8.9 g/dL Final     Alkaline Phosphatase 11/29/2018 61  34 - 104 U/L Final     ALT 11/29/2018 17  7 - 52 U/L Final     AST 11/29/2018 15  13 - 39 U/L Final     Cholesterol 11/29/2018 243* <200 mg/dL Final     Triglycerides 11/29/2018 167* <150 mg/dL Final    Comment: Borderline high:  150-199 mg/dl  High:             200-499 mg/dl  Very high:       >499 mg/dl       HDL Cholesterol 11/29/2018 78  23 - 92 mg/dL Final     LDL  Cholesterol Calculated 11/29/2018 132* <100 mg/dL Final    Comment: Above desirable:  100-129 mg/dl  Borderline High:  130-159 mg/dL  High:             160-189 mg/dL  Very high:       >189 mg/dl       Non HDL Cholesterol 11/29/2018 165* <130 mg/dL Final    Comment: Above Desirable:  130-159 mg/dl  Borderline high:  160-189 mg/dl  High:             190-219 mg/dl  Very high:       >219 mg/dl           Cece Freeman MD  [facility]

## 2019-02-15 ENCOUNTER — OFFICE VISIT (OUTPATIENT)
Dept: FAMILY MEDICINE | Facility: OTHER | Age: 64
End: 2019-02-15
Attending: FAMILY MEDICINE
Payer: MEDICAID

## 2019-02-15 VITALS
RESPIRATION RATE: 24 BRPM | HEART RATE: 60 BPM | SYSTOLIC BLOOD PRESSURE: 120 MMHG | DIASTOLIC BLOOD PRESSURE: 70 MMHG | BODY MASS INDEX: 36.99 KG/M2 | WEIGHT: 199 LBS | TEMPERATURE: 97.7 F

## 2019-02-15 DIAGNOSIS — R21 RASH: Primary | ICD-10-CM

## 2019-02-15 PROCEDURE — G0463 HOSPITAL OUTPT CLINIC VISIT: HCPCS

## 2019-02-15 PROCEDURE — 99213 OFFICE O/P EST LOW 20 MIN: CPT | Performed by: FAMILY MEDICINE

## 2019-02-15 RX ORDER — TRIAMCINOLONE ACETONIDE 1 MG/G
CREAM TOPICAL 2 TIMES DAILY
Qty: 45 G | Refills: 0 | Status: SHIPPED | OUTPATIENT
Start: 2019-02-15 | End: 2020-02-04

## 2019-02-15 ASSESSMENT — PAIN SCALES - GENERAL: PAINLEVEL: EXTREME PAIN (8)

## 2019-02-15 NOTE — PROGRESS NOTES
SUBJECTIVE:   Steffany Altamirano is a 63 year old female who presents to clinic today for the following health issues:  Nursing Notes:   Rylie Hein LPN  2/15/2019 10:22 AM  Signed  Patient is here for on going hand issues and skin. Patient also c/o spot between toes on left foot.  Rylie Hein LPN .............2/15/2019     9:57 AM      No LMP recorded. Patient has had a hysterectomy.  Medication Reconciliation: complete    Rylie Hein LPN  2/15/2019 10:02 AM      HPI    63-year-old female presents with persistent rash on her hands.  This is been a recurrent problem for many years.  It is worse during the wintertime.  She continues to use latex rubber gloves at work.  She has been using a mid potency steroid ointment and [employer].    Patient Active Problem List    Diagnosis Date Noted     Obesity (BMI 35.0-39.9) with comorbidity (H) 11/29/2018     Priority: Medium     Displacement of cervical intervertebral disc without myelopathy 01/24/2018     Priority: Medium     Hyperlipidemia 01/24/2018     Priority: Medium     Hypertension 01/24/2018     Priority: Medium     Obesity 01/24/2018     Priority: Medium     Chronic dermatitis of hands 03/24/2016     Priority: Medium     Diverticulosis of sigmoid colon 01/20/2014     Priority: Medium     Lipoma of other skin and subcutaneous tissue 01/29/2013     Priority: Medium     Dyshidrotic eczema 06/22/2012     Priority: Medium     Abnormal glucose 03/15/2012     Priority: Medium     Umbilical hernia 02/27/2012     Priority: Medium     Sjogren's syndrome (H) 01/26/2012     Priority: Medium     Plantar fasciitis, bilateral 10/21/2010     Priority: Medium     Past Medical History:   Diagnosis Date     Essential (primary) hypertension     Hypertension     Prediabetes     No Comments Provided     Sicca syndrome (H)     Possible Sjogren's      Past Surgical History:   Procedure Laterality Date     ARTHROSCOPY SHOULDER ROTATOR CUFF REPAIR Right 1998    Dr. Sweet       SECTION  1972    ,Epigastric hernia repair by [facility]     CHOLECYSTECTOMY  1984     COLONOSCOPY  2014    F/U      HERNIA REPAIR  10/25/2002    Epigastric hernia repair by [facility]     HYSTERECTOMY TOTAL ABDOMINAL, BILATERAL SALPINGO-OOPHORECTOMY, COMBINED  1996    Supracervical hysterectomy and bilateral oophorectomy     LAPAROSCOPIC TUBAL LIGATION            Resection of lipoma of LUQ  [id number]            Review of Systems     OBJECTIVE:     /70 (BP Location: Right arm, Patient Position: Sitting, Cuff Size: Adult Large)   Pulse 60   Temp 97.7  F (36.5  C) (Tympanic)   Resp 24   Wt 90.3 kg (199 lb)   Breastfeeding? No   BMI 36.99 kg/m    Body mass index is 36.99 kg/m .  Physical Exam   Skin:   Bilateral hand rash, dorsum, excorations and cracking of skin       Diagnostic Test Results:  none     ASSESSMENT/PLAN:           ICD-10-CM    1. Rash R21 triamcinolone (KENALOG) 0.1 % external cream     Contact dermatitis related to continued use of latex rubber gloves.  Recommend discontinuation of these labs.  Will try triamcinolone cream applied twice daily for 7 days.   start Eucerin cream applied 2-3 times daily.  I believe her rash will resolve when she discontinues the use of the gloves  Patient Instructions   Apply steroid cream twice daily then apply Eucerin cream (thick cream in tub)    Then use silk gloves at night    Avoid latex products    Use corn pads on toe, use file to remove callus and reapply pad each day until resolved        Cece Freeman MD  [facility] AND [facility]

## 2019-02-15 NOTE — NURSING NOTE
Patient is here for on going hand issues and skin. Patient also c/o spot between toes on left foot.  Rylie Hein LPN .............2/15/2019     9:57 AM      No LMP recorded. Patient has had a hysterectomy.  Medication Reconciliation: complete    Rylie Hein LPN  2/15/2019 10:02 AM

## 2019-02-15 NOTE — PATIENT INSTRUCTIONS
Apply steroid cream twice daily then apply Eucerin cream (thick cream in tub)    Then use silk gloves at night    Avoid latex products    Use corn pads on toe, use file to remove callus and reapply pad each day until resolved

## 2019-12-17 ENCOUNTER — HOSPITAL ENCOUNTER (OUTPATIENT)
Dept: MAMMOGRAPHY | Facility: OTHER | Age: 64
Discharge: HOME | End: 2019-12-17
Attending: FAMILY MEDICINE | Admitting: FAMILY MEDICINE
Payer: MEDICARE

## 2019-12-17 DIAGNOSIS — Z12.31 VISIT FOR SCREENING MAMMOGRAM: ICD-10-CM

## 2019-12-17 PROCEDURE — 77063 BREAST TOMOSYNTHESIS BI: CPT

## 2020-02-04 ENCOUNTER — TELEPHONE (OUTPATIENT)
Dept: FAMILY MEDICINE | Facility: OTHER | Age: 65
End: 2020-02-04

## 2020-02-04 ENCOUNTER — HOSPITAL ENCOUNTER (OUTPATIENT)
Dept: GENERAL RADIOLOGY | Facility: OTHER | Age: 65
Discharge: HOME | End: 2020-02-04
Attending: FAMILY MEDICINE | Admitting: FAMILY MEDICINE
Payer: COMMERCIAL

## 2020-02-04 ENCOUNTER — OFFICE VISIT (OUTPATIENT)
Dept: FAMILY MEDICINE | Facility: OTHER | Age: 65
End: 2020-02-04
Attending: FAMILY MEDICINE
Payer: COMMERCIAL

## 2020-02-04 VITALS
DIASTOLIC BLOOD PRESSURE: 78 MMHG | WEIGHT: 200.4 LBS | OXYGEN SATURATION: 97 % | BODY MASS INDEX: 36.88 KG/M2 | SYSTOLIC BLOOD PRESSURE: 126 MMHG | HEIGHT: 62 IN | TEMPERATURE: 96.4 F | HEART RATE: 74 BPM | RESPIRATION RATE: 16 BRPM

## 2020-02-04 DIAGNOSIS — S86.912A KNEE STRAIN, LEFT, INITIAL ENCOUNTER: ICD-10-CM

## 2020-02-04 DIAGNOSIS — E03.9 HYPOTHYROIDISM, UNSPECIFIED TYPE: ICD-10-CM

## 2020-02-04 DIAGNOSIS — I10 BENIGN ESSENTIAL HYPERTENSION: ICD-10-CM

## 2020-02-04 DIAGNOSIS — E78.2 MIXED HYPERLIPIDEMIA: Primary | ICD-10-CM

## 2020-02-04 LAB
ALBUMIN SERPL-MCNC: 3.9 G/DL (ref 3.5–5.7)
ALP SERPL-CCNC: 63 U/L (ref 34–104)
ALT SERPL W P-5'-P-CCNC: 17 U/L (ref 7–52)
ANION GAP SERPL CALCULATED.3IONS-SCNC: 6 MMOL/L (ref 3–14)
AST SERPL W P-5'-P-CCNC: 14 U/L (ref 13–39)
BILIRUB SERPL-MCNC: 0.7 MG/DL (ref 0.3–1)
BUN SERPL-MCNC: 13 MG/DL (ref 7–25)
CALCIUM SERPL-MCNC: 9.6 MG/DL (ref 8.6–10.3)
CHLORIDE SERPL-SCNC: 104 MMOL/L (ref 98–107)
CHOLEST SERPL-MCNC: 228 MG/DL
CO2 SERPL-SCNC: 30 MMOL/L (ref 21–31)
CREAT SERPL-MCNC: 0.8 MG/DL (ref 0.6–1.2)
GFR SERPL CREATININE-BSD FRML MDRD: 72 ML/MIN/{1.73_M2}
GLUCOSE SERPL-MCNC: 102 MG/DL (ref 70–105)
HDLC SERPL-MCNC: 77 MG/DL (ref 23–92)
LDLC SERPL CALC-MCNC: 129 MG/DL
NONHDLC SERPL-MCNC: 151 MG/DL
POTASSIUM SERPL-SCNC: 4 MMOL/L (ref 3.5–5.1)
PROT SERPL-MCNC: 7.2 G/DL (ref 6.4–8.9)
SODIUM SERPL-SCNC: 140 MMOL/L (ref 134–144)
TRIGL SERPL-MCNC: 112 MG/DL
TSH SERPL DL<=0.05 MIU/L-ACNC: 2.15 IU/ML (ref 0.34–5.6)

## 2020-02-04 PROCEDURE — 73562 X-RAY EXAM OF KNEE 3: CPT | Mod: LT

## 2020-02-04 PROCEDURE — 84443 ASSAY THYROID STIM HORMONE: CPT | Mod: ZL | Performed by: FAMILY MEDICINE

## 2020-02-04 PROCEDURE — 36415 COLL VENOUS BLD VENIPUNCTURE: CPT | Mod: ZL | Performed by: FAMILY MEDICINE

## 2020-02-04 PROCEDURE — 80053 COMPREHEN METABOLIC PANEL: CPT | Mod: ZL | Performed by: FAMILY MEDICINE

## 2020-02-04 PROCEDURE — 99214 OFFICE O/P EST MOD 30 MIN: CPT | Performed by: FAMILY MEDICINE

## 2020-02-04 PROCEDURE — G0463 HOSPITAL OUTPT CLINIC VISIT: HCPCS

## 2020-02-04 PROCEDURE — 80061 LIPID PANEL: CPT | Mod: ZL | Performed by: FAMILY MEDICINE

## 2020-02-04 RX ORDER — LISINOPRIL AND HYDROCHLOROTHIAZIDE 20; 25 MG/1; MG/1
1 TABLET ORAL DAILY
Qty: 90 TABLET | Refills: 3 | Status: SHIPPED | OUTPATIENT
Start: 2020-02-04 | End: 2022-05-25

## 2020-02-04 RX ORDER — LEVOTHYROXINE SODIUM 50 UG/1
50 TABLET ORAL
Qty: 90 TABLET | Refills: 3 | Status: SHIPPED | OUTPATIENT
Start: 2020-02-04 | End: 2023-03-06

## 2020-02-04 RX ORDER — RIZATRIPTAN BENZOATE 5 MG/1
5 TABLET, ORALLY DISINTEGRATING ORAL 2 TIMES DAILY PRN
Qty: 30 TABLET | Refills: 3 | Status: SHIPPED | OUTPATIENT
Start: 2020-02-04 | End: 2022-10-12

## 2020-02-04 RX ORDER — POTASSIUM CHLORIDE 1500 MG/1
20 TABLET, EXTENDED RELEASE ORAL
Qty: 90 TABLET | Refills: 3 | Status: SHIPPED | OUTPATIENT
Start: 2020-02-04 | End: 2022-10-12

## 2020-02-04 ASSESSMENT — ENCOUNTER SYMPTOMS
ARTHRALGIAS: 0
NECK PAIN: 0
NECK STIFFNESS: 0
JOINT SWELLING: 0
BACK PAIN: 0
FATIGUE: 0
FEVER: 0
MYALGIAS: 0

## 2020-02-04 ASSESSMENT — MIFFLIN-ST. JEOR: SCORE: 1407.26

## 2020-02-04 ASSESSMENT — PAIN SCALES - GENERAL: PAINLEVEL: SEVERE PAIN (6)

## 2020-02-04 NOTE — LETTER
February 6, 2020      Steffany Altamirano     [place] [address]        Dear ,    We are writing to inform you of your test results.    Your test results fall within the expected range(s) or remain unchanged from previous results.  Please continue with current treatment plan.    Resulted Orders   Comprehensive metabolic panel   Result Value Ref Range    Sodium 140 134 - 144 mmol/L    Potassium 4.0 3.5 - 5.1 mmol/L    Chloride 104 98 - 107 mmol/L    Carbon Dioxide 30 21 - 31 mmol/L    Anion Gap 6 3 - 14 mmol/L    Glucose 102 70 - 105 mg/dL    Urea Nitrogen 13 7 - 25 mg/dL    Creatinine 0.80 0.60 - 1.20 mg/dL    GFR Estimate 72 >60 mL/min/[1.73_m2]    GFR Estimate If Black 87 >60 mL/min/[1.73_m2]    Calcium 9.6 8.6 - 10.3 mg/dL    Bilirubin Total 0.7 0.3 - 1.0 mg/dL    Albumin 3.9 3.5 - 5.7 g/dL    Protein Total 7.2 6.4 - 8.9 g/dL    Alkaline Phosphatase 63 34 - 104 U/L    ALT 17 7 - 52 U/L    AST 14 13 - 39 U/L   TSH   Result Value Ref Range    Thyrotropin 2.15 0.34 - 5.60 IU/mL   Lipid Profile   Result Value Ref Range    Cholesterol 228 (H) <200 mg/dL    Triglycerides 112 <150 mg/dL    HDL Cholesterol 77 23 - 92 mg/dL    LDL Cholesterol Calculated 129 (H) <100 mg/dL      Comment:      Above desirable:  100-129 mg/dl  Borderline High:  130-159 mg/dL  High:             160-189 mg/dL  Very high:       >189 mg/dl      Non HDL Cholesterol 151 (H) <130 mg/dL      Comment:      Above Desirable:  130-159 mg/dl  Borderline high:  160-189 mg/dl  High:             190-219 mg/dl  Very high:       >219 mg/dl         If you have any questions or concerns, please call the clinic at the number listed above.       Sincerely,        Cece Freeman MD

## 2020-02-04 NOTE — PROGRESS NOTES
"  SUBJECTIVE:   Steffany Altamirano is a 64 year old female who presents to clinic today for the following health issues:  Nursing Notes:   Julia Barlow LPN  2/4/2020 10:36 AM  Signed  Patient presents to the clinic today for left knee pain.  Patient states she slipped on the ice a month ago.  Patient states that the pain is on the inside of her knee.  Julia Barlow LPN 2/4/2020   10:17 AM    Chief Complaint   Patient presents with     Knee Pain       Initial /78 (BP Location: Right arm, Patient Position: Sitting, Cuff Size: Adult Regular)   Pulse 74   Temp 96.4  F (35.8  C) (Tympanic)   Resp 16   Ht 1.575 m (5' 2\")   Wt 90.9 kg (200 lb 6.4 oz)   SpO2 97%   Breastfeeding No   BMI 36.65 kg/m    Estimated body mass index is 36.65 kg/m  as calculated from the following:    Height as of this encounter: 1.575 m (5' 2\").    Weight as of this encounter: 90.9 kg (200 lb 6.4 oz).  Medication Reconciliation: complete  Julia Barlow LPN      HPI    63 yo female presents with left knee injury. 4 weeks ago, she slipped on ice while walking at home, foot sled laterally and medial knee hit the snow.  Reports difficulty ambulating immediately afterwards.  She is now able to ambulate but reports pain when trying to cross her legs or forced flexion.  No bruising, joint effusion.  Does notice some mild swelling medial aspect of the left knee.    She is also due for follow-up of hypertension dyslipidemia.  Due for fasting labs.  She missed her appointment last week.  Ports blood pressures at home have been 120s over 70.    Patient Active Problem List    Diagnosis Date Noted     Obesity (BMI 35.0-39.9) with comorbidity (H) 11/29/2018     Priority: Medium     Displacement of cervical intervertebral disc without myelopathy 01/24/2018     Priority: Medium     Hyperlipidemia 01/24/2018     Priority: Medium     Hypertension 01/24/2018     Priority: Medium     Obesity 01/24/2018     Priority: Medium     Chronic " "dermatitis of hands 03/24/2016     Priority: Medium     Diverticulosis of sigmoid colon 01/20/2014     Priority: Medium     Lipoma of other skin and subcutaneous tissue 01/29/2013     Priority: Medium     Dyshidrotic eczema 06/22/2012     Priority: Medium     Abnormal glucose 03/15/2012     Priority: Medium     Umbilical hernia 02/27/2012     Priority: Medium     Sjogren's syndrome (H) 01/26/2012     Priority: Medium     Plantar fasciitis, bilateral 10/21/2010     Priority: Medium     Past Medical History:   Diagnosis Date     Essential (primary) hypertension     Hypertension     Prediabetes     No Comments Provided     Sicca syndrome (H)     Possible Sjogren's      Current Outpatient Medications   Medication Sig Dispense Refill     levothyroxine (SYNTHROID/LEVOTHROID) 50 MCG tablet Take 1 tablet (50 mcg) by mouth every morning (before breakfast) 90 tablet 3     lisinopril-hydrochlorothiazide (PRINZIDE/ZESTORETIC) 20-25 MG tablet Take 1 tablet by mouth daily 90 tablet 3     potassium chloride ER (K-DUR/KLOR-CON M) 20 MEQ CR tablet Take 1 tablet (20 mEq) by mouth daily with food 90 tablet 3     rizatriptan (MAXALT-MLT) 5 MG ODT Take 1 tablet (5 mg) by mouth 2 times daily as needed for migraine Give at minimum 2 hrs apart. Max Dose: 30 mg per 24 hrs 30 tablet 3     magnesium oxide (MAG-OX) 400 MG tablet Take 1 tablet by mouth daily         Review of Systems   Constitutional: Negative for fatigue and fever.   Musculoskeletal: Negative for arthralgias, back pain, gait problem, joint swelling, myalgias, neck pain and neck stiffness.        OBJECTIVE:     /78 (BP Location: Right arm, Patient Position: Sitting, Cuff Size: Adult Regular)   Pulse 74   Temp 96.4  F (35.8  C) (Tympanic)   Resp 16   Ht 1.575 m (5' 2\")   Wt 90.9 kg (200 lb 6.4 oz)   SpO2 97%   Breastfeeding No   BMI 36.65 kg/m    Body mass index is 36.65 kg/m .  Physical Exam  Cardiovascular:      Rate and Rhythm: Normal rate and regular rhythm.    "   Heart sounds: No murmur.   Pulmonary:      Effort: Pulmonary effort is normal. No respiratory distress.      Breath sounds: Normal breath sounds.   Musculoskeletal:      Left knee: She exhibits decreased range of motion. She exhibits no swelling, no effusion, no ecchymosis, no deformity, no laceration, no bony tenderness, normal meniscus and no MCL laxity. Tenderness found. MCL tenderness noted. No medial joint line, no lateral joint line and no LCL tenderness noted.   Neurological:      Mental Status: She is alert.         TECHNIQUE: 3 views.     FINDINGS: No acute fracture or dislocation is seen. No joint effusion  is seen.     There is moderate narrowing of the medial joint space. Lateral joint  space is well preserved. There is narrowing of the patellofemoral  joint especially just medial to the patellar apex.                                                                        IMPRESSION: Degenerative change without acute abnormality.     SANGEETA SOSA MD    ASSESSMENT/PLAN:         1. Hypothyroidism, unspecified type  Due for TSH.  Refilled Synthroid.  - levothyroxine (SYNTHROID/LEVOTHROID) 50 MCG tablet; Take 1 tablet (50 mcg) by mouth every morning (before breakfast)  Dispense: 90 tablet; Refill: 3  - rizatriptan (MAXALT-MLT) 5 MG ODT; Take 1 tablet (5 mg) by mouth 2 times daily as needed for migraine Give at minimum 2 hrs apart. Max Dose: 30 mg per 24 hrs  Dispense: 30 tablet; Refill: 3  - TSH; Future  - TSH    2. Benign essential hypertension  Continue current antihypertensive agents.  - lisinopril-hydrochlorothiazide (PRINZIDE/ZESTORETIC) 20-25 MG tablet; Take 1 tablet by mouth daily  Dispense: 90 tablet; Refill: 3  - potassium chloride ER (K-DUR/KLOR-CON M) 20 MEQ CR tablet; Take 1 tablet (20 mEq) by mouth daily with food  Dispense: 90 tablet; Refill: 3    3. Mixed hyperlipidemia  Refilled statin medication.  CMP and lipid panel are pending  - Lipid Profile; Future  - Comprehensive metabolic  panel; Future  - Comprehensive metabolic panel  - Lipid Profile    4. Knee strain, left, initial encounter  Mechanism of injury and exam consistent with MCL strain.  Given trauma will proceed with x-ray.  Recommend hinged knee brace for the next 2 to 3 weeks, NSAIDs and icing in the evening.  Should her symptoms persist we will consider MRI scan.  - XR Knee Left 3 Views; Future  - DME REFERRAL    Reviewed x-ray.  No obvious fracture.  Moderate DJD.  No joint effusion.    Cece Freeman MD  [facility] AND [facility]

## 2020-02-04 NOTE — NURSING NOTE
"Patient presents to the clinic today for left knee pain.  Patient states she slipped on the ice a month ago.  Patient states that the pain is on the inside of her knee.  Julia Barlow LPN 2/4/2020   10:17 AM    Chief Complaint   Patient presents with     Knee Pain       Initial /78 (BP Location: Right arm, Patient Position: Sitting, Cuff Size: Adult Regular)   Pulse 74   Temp 96.4  F (35.8  C) (Tympanic)   Resp 16   Ht 1.575 m (5' 2\")   Wt 90.9 kg (200 lb 6.4 oz)   SpO2 97%   Breastfeeding No   BMI 36.65 kg/m   Estimated body mass index is 36.65 kg/m  as calculated from the following:    Height as of this encounter: 1.575 m (5' 2\").    Weight as of this encounter: 90.9 kg (200 lb 6.4 oz).  Medication Reconciliation: complete  Julia Barlow LPN    "

## 2020-02-04 NOTE — TELEPHONE ENCOUNTER
Spoke to patient.  She was calling regarding results.  This writer informed patient of results.  This writer will now close this encounter.  Julia Barlow LPN 2/4/2020   3:16 PM

## 2020-05-20 ENCOUNTER — APPOINTMENT (OUTPATIENT)
Dept: CT IMAGING | Facility: OTHER | Age: 65
DRG: 603 | End: 2020-05-20
Attending: PHYSICIAN ASSISTANT
Payer: COMMERCIAL

## 2020-05-20 ENCOUNTER — HOSPITAL ENCOUNTER (INPATIENT)
Facility: OTHER | Age: 65
LOS: 2 days | Discharge: HOME | DRG: 603 | End: 2020-05-22
Attending: PHYSICIAN ASSISTANT | Admitting: INTERNAL MEDICINE
Payer: COMMERCIAL

## 2020-05-20 DIAGNOSIS — L03.211 FACIAL CELLULITIS: Primary | ICD-10-CM

## 2020-05-20 DIAGNOSIS — R50.9 FEVER: ICD-10-CM

## 2020-05-20 DIAGNOSIS — Z79.899 ENCOUNTER FOR LONG-TERM (CURRENT) USE OF OTHER MEDICATIONS: ICD-10-CM

## 2020-05-20 DIAGNOSIS — L03.211 CELLULITIS, FACE: ICD-10-CM

## 2020-05-20 DIAGNOSIS — T78.3XXA ANGIOEDEMA, INITIAL ENCOUNTER: ICD-10-CM

## 2020-05-20 DIAGNOSIS — R50.9 HYPERTHERMIA-INDUCED DEFECT: ICD-10-CM

## 2020-05-20 DIAGNOSIS — Z87.891 PERSONAL HISTORY OF TOBACCO USE, PRESENTING HAZARDS TO HEALTH: ICD-10-CM

## 2020-05-20 LAB
ALBUMIN SERPL-MCNC: 3.6 G/DL (ref 3.5–5.7)
ALBUMIN UR-MCNC: 10 MG/DL
ALP SERPL-CCNC: 56 U/L (ref 34–104)
ALT SERPL W P-5'-P-CCNC: 19 U/L (ref 7–52)
ANION GAP SERPL CALCULATED.3IONS-SCNC: 7 MMOL/L (ref 3–14)
APPEARANCE UR: CLEAR
AST SERPL W P-5'-P-CCNC: 17 U/L (ref 13–39)
BASOPHILS # BLD AUTO: 0.1 10E9/L (ref 0–0.2)
BASOPHILS NFR BLD AUTO: 0.4 %
BILIRUB SERPL-MCNC: 0.9 MG/DL (ref 0.3–1)
BILIRUB UR QL STRIP: NEGATIVE
BUN SERPL-MCNC: 12 MG/DL (ref 7–25)
CALCIUM SERPL-MCNC: 8.9 MG/DL (ref 8.6–10.3)
CHLORIDE SERPL-SCNC: 98 MMOL/L (ref 98–107)
CO2 SERPL-SCNC: 28 MMOL/L (ref 21–31)
COLOR UR AUTO: YELLOW
CREAT SERPL-MCNC: 0.87 MG/DL (ref 0.6–1.2)
CRP SERPL-MCNC: 15.9 MG/L
DIFFERENTIAL METHOD BLD: ABNORMAL
EOSINOPHIL # BLD AUTO: 0 10E9/L (ref 0–0.7)
EOSINOPHIL NFR BLD AUTO: 0 %
ERYTHROCYTE [DISTWIDTH] IN BLOOD BY AUTOMATED COUNT: 12.7 % (ref 10–15)
ERYTHROCYTE [SEDIMENTATION RATE] IN BLOOD BY WESTERGREN METHOD: 20 MM/H (ref 1–15)
GFR SERPL CREATININE-BSD FRML MDRD: 65 ML/MIN/{1.73_M2}
GLUCOSE SERPL-MCNC: 124 MG/DL (ref 70–105)
GLUCOSE UR STRIP-MCNC: NEGATIVE MG/DL
HCT VFR BLD AUTO: 41 % (ref 35–47)
HGB BLD-MCNC: 13.6 G/DL (ref 11.7–15.7)
HGB UR QL STRIP: ABNORMAL
IMM GRANULOCYTES # BLD: 0.1 10E9/L (ref 0–0.4)
IMM GRANULOCYTES NFR BLD: 0.6 %
INR PPP: 1.35 (ref 0–1.3)
KETONES UR STRIP-MCNC: 40 MG/DL
LACTATE BLD-SCNC: 1 MMOL/L (ref 0.7–2)
LEUKOCYTE ESTERASE UR QL STRIP: ABNORMAL
LYMPHOCYTES # BLD AUTO: 0.7 10E9/L (ref 0.8–5.3)
LYMPHOCYTES NFR BLD AUTO: 5.3 %
MCH RBC QN AUTO: 29.8 PG (ref 26.5–33)
MCHC RBC AUTO-ENTMCNC: 33.2 G/DL (ref 31.5–36.5)
MCV RBC AUTO: 90 FL (ref 78–100)
MONOCYTES # BLD AUTO: 0.9 10E9/L (ref 0–1.3)
MONOCYTES NFR BLD AUTO: 6.5 %
MUCOUS THREADS #/AREA URNS LPF: PRESENT /LPF
NEUTROPHILS # BLD AUTO: 12.3 10E9/L (ref 1.6–8.3)
NEUTROPHILS NFR BLD AUTO: 87.2 %
NITRATE UR QL: NEGATIVE
NT-PROBNP SERPL-MCNC: 37 PG/ML (ref 0–100)
PH UR STRIP: 5 PH (ref 5–7)
PLATELET # BLD AUTO: 175 10E9/L (ref 150–450)
POTASSIUM SERPL-SCNC: 3.6 MMOL/L (ref 3.5–5.1)
PROT SERPL-MCNC: 6.8 G/DL (ref 6.4–8.9)
RBC # BLD AUTO: 4.57 10E12/L (ref 3.8–5.2)
RBC #/AREA URNS AUTO: 2 /HPF (ref 0–2)
SARS-COV-2 PCR COMMENT: NORMAL
SARS-COV-2 RNA SPEC QL NAA+PROBE: NEGATIVE
SARS-COV-2 RNA SPEC QL NAA+PROBE: NORMAL
SODIUM SERPL-SCNC: 133 MMOL/L (ref 134–144)
SOURCE: ABNORMAL
SP GR UR STRIP: 1.02 (ref 1–1.03)
SPECIMEN SOURCE: NORMAL
SPECIMEN SOURCE: NORMAL
SQUAMOUS #/AREA URNS AUTO: 5 /HPF (ref 0–1)
TROPONIN I SERPL-MCNC: 6.7 PG/ML
UROBILINOGEN UR STRIP-MCNC: NORMAL MG/DL (ref 0–2)
WBC # BLD AUTO: 14.1 10E9/L (ref 4–11)
WBC #/AREA URNS AUTO: 21 /HPF (ref 0–5)

## 2020-05-20 PROCEDURE — 36415 COLL VENOUS BLD VENIPUNCTURE: CPT | Performed by: PHYSICIAN ASSISTANT

## 2020-05-20 PROCEDURE — 25000128 H RX IP 250 OP 636: Performed by: INTERNAL MEDICINE

## 2020-05-20 PROCEDURE — 85025 COMPLETE CBC W/AUTO DIFF WBC: CPT | Performed by: PHYSICIAN ASSISTANT

## 2020-05-20 PROCEDURE — 96368 THER/DIAG CONCURRENT INF: CPT | Performed by: PHYSICIAN ASSISTANT

## 2020-05-20 PROCEDURE — 25800030 ZZH RX IP 258 OP 636: Performed by: INTERNAL MEDICINE

## 2020-05-20 PROCEDURE — 85610 PROTHROMBIN TIME: CPT | Performed by: PHYSICIAN ASSISTANT

## 2020-05-20 PROCEDURE — 25000132 ZZH RX MED GY IP 250 OP 250 PS 637: Performed by: PHYSICIAN ASSISTANT

## 2020-05-20 PROCEDURE — 87635 SARS-COV-2 COVID-19 AMP PRB: CPT | Performed by: PHYSICIAN ASSISTANT

## 2020-05-20 PROCEDURE — 71260 CT THORAX DX C+: CPT

## 2020-05-20 PROCEDURE — 25500064 ZZH RX 255 OP 636: Performed by: PHYSICIAN ASSISTANT

## 2020-05-20 PROCEDURE — 81001 URINALYSIS AUTO W/SCOPE: CPT | Performed by: PHYSICIAN ASSISTANT

## 2020-05-20 PROCEDURE — 80053 COMPREHEN METABOLIC PANEL: CPT | Performed by: PHYSICIAN ASSISTANT

## 2020-05-20 PROCEDURE — 83605 ASSAY OF LACTIC ACID: CPT | Performed by: PHYSICIAN ASSISTANT

## 2020-05-20 PROCEDURE — 84484 ASSAY OF TROPONIN QUANT: CPT | Performed by: PHYSICIAN ASSISTANT

## 2020-05-20 PROCEDURE — 87081 CULTURE SCREEN ONLY: CPT | Performed by: PHYSICIAN ASSISTANT

## 2020-05-20 PROCEDURE — 85652 RBC SED RATE AUTOMATED: CPT | Performed by: PHYSICIAN ASSISTANT

## 2020-05-20 PROCEDURE — 99285 EMERGENCY DEPT VISIT HI MDM: CPT | Mod: 25 | Performed by: PHYSICIAN ASSISTANT

## 2020-05-20 PROCEDURE — 96366 THER/PROPH/DIAG IV INF ADDON: CPT | Performed by: PHYSICIAN ASSISTANT

## 2020-05-20 PROCEDURE — 86140 C-REACTIVE PROTEIN: CPT | Performed by: PHYSICIAN ASSISTANT

## 2020-05-20 PROCEDURE — 96365 THER/PROPH/DIAG IV INF INIT: CPT | Performed by: PHYSICIAN ASSISTANT

## 2020-05-20 PROCEDURE — 83880 ASSAY OF NATRIURETIC PEPTIDE: CPT | Performed by: PHYSICIAN ASSISTANT

## 2020-05-20 PROCEDURE — 87086 URINE CULTURE/COLONY COUNT: CPT | Performed by: PHYSICIAN ASSISTANT

## 2020-05-20 PROCEDURE — 96375 TX/PRO/DX INJ NEW DRUG ADDON: CPT | Performed by: PHYSICIAN ASSISTANT

## 2020-05-20 PROCEDURE — 25000128 H RX IP 250 OP 636: Performed by: PHYSICIAN ASSISTANT

## 2020-05-20 PROCEDURE — 12000000 ZZH R&B MED SURG/OB

## 2020-05-20 PROCEDURE — 99222 1ST HOSP IP/OBS MODERATE 55: CPT | Mod: AI | Performed by: INTERNAL MEDICINE

## 2020-05-20 PROCEDURE — 25800030 ZZH RX IP 258 OP 636: Performed by: PHYSICIAN ASSISTANT

## 2020-05-20 PROCEDURE — 70450 CT HEAD/BRAIN W/O DYE: CPT

## 2020-05-20 PROCEDURE — 87040 BLOOD CULTURE FOR BACTERIA: CPT | Performed by: PHYSICIAN ASSISTANT

## 2020-05-20 RX ORDER — POTASSIUM CHLORIDE 1500 MG/1
20-40 TABLET, EXTENDED RELEASE ORAL
Status: DISCONTINUED | OUTPATIENT
Start: 2020-05-20 | End: 2020-05-22 | Stop reason: HOSPADM

## 2020-05-20 RX ORDER — SODIUM CHLORIDE 9 MG/ML
1000 INJECTION, SOLUTION INTRAVENOUS CONTINUOUS
Status: DISCONTINUED | OUTPATIENT
Start: 2020-05-20 | End: 2020-05-20

## 2020-05-20 RX ORDER — LIDOCAINE 40 MG/G
CREAM TOPICAL
Status: DISCONTINUED | OUTPATIENT
Start: 2020-05-20 | End: 2020-05-22 | Stop reason: HOSPADM

## 2020-05-20 RX ORDER — LISINOPRIL AND HYDROCHLOROTHIAZIDE 20; 25 MG/1; MG/1
1 TABLET ORAL DAILY
Status: DISCONTINUED | OUTPATIENT
Start: 2020-05-21 | End: 2020-05-20 | Stop reason: CLARIF

## 2020-05-20 RX ORDER — AMOXICILLIN 250 MG
2 CAPSULE ORAL 2 TIMES DAILY PRN
Status: DISCONTINUED | OUTPATIENT
Start: 2020-05-20 | End: 2020-05-22 | Stop reason: HOSPADM

## 2020-05-20 RX ORDER — DIPHENHYDRAMINE HYDROCHLORIDE 50 MG/ML
25 INJECTION INTRAMUSCULAR; INTRAVENOUS ONCE
Status: COMPLETED | OUTPATIENT
Start: 2020-05-20 | End: 2020-05-20

## 2020-05-20 RX ORDER — CEFTRIAXONE SODIUM 1 G/50ML
1 INJECTION, SOLUTION INTRAVENOUS ONCE
Status: COMPLETED | OUTPATIENT
Start: 2020-05-20 | End: 2020-05-20

## 2020-05-20 RX ORDER — CEFTRIAXONE 2 G/50ML
2 INJECTION, SOLUTION INTRAVENOUS EVERY 24 HOURS
Status: DISCONTINUED | OUTPATIENT
Start: 2020-05-21 | End: 2020-05-22 | Stop reason: HOSPADM

## 2020-05-20 RX ORDER — NALOXONE HYDROCHLORIDE 0.4 MG/ML
.1-.4 INJECTION, SOLUTION INTRAMUSCULAR; INTRAVENOUS; SUBCUTANEOUS
Status: DISCONTINUED | OUTPATIENT
Start: 2020-05-20 | End: 2020-05-22 | Stop reason: HOSPADM

## 2020-05-20 RX ORDER — ONDANSETRON 4 MG/1
4 TABLET, ORALLY DISINTEGRATING ORAL EVERY 6 HOURS PRN
Status: DISCONTINUED | OUTPATIENT
Start: 2020-05-20 | End: 2020-05-22 | Stop reason: HOSPADM

## 2020-05-20 RX ORDER — LEVOTHYROXINE SODIUM 50 UG/1
50 TABLET ORAL
Status: DISCONTINUED | OUTPATIENT
Start: 2020-05-21 | End: 2020-05-22 | Stop reason: HOSPADM

## 2020-05-20 RX ORDER — ACETAMINOPHEN 500 MG
500 TABLET ORAL ONCE
Status: COMPLETED | OUTPATIENT
Start: 2020-05-20 | End: 2020-05-20

## 2020-05-20 RX ORDER — MAGNESIUM SULFATE HEPTAHYDRATE 40 MG/ML
4 INJECTION, SOLUTION INTRAVENOUS EVERY 4 HOURS PRN
Status: DISCONTINUED | OUTPATIENT
Start: 2020-05-20 | End: 2020-05-22 | Stop reason: HOSPADM

## 2020-05-20 RX ORDER — POLYETHYLENE GLYCOL 3350 17 G/17G
17 POWDER, FOR SOLUTION ORAL DAILY PRN
Status: DISCONTINUED | OUTPATIENT
Start: 2020-05-20 | End: 2020-05-22 | Stop reason: HOSPADM

## 2020-05-20 RX ORDER — ONDANSETRON 2 MG/ML
4 INJECTION INTRAMUSCULAR; INTRAVENOUS EVERY 6 HOURS PRN
Status: DISCONTINUED | OUTPATIENT
Start: 2020-05-20 | End: 2020-05-22 | Stop reason: HOSPADM

## 2020-05-20 RX ORDER — HYDROCHLOROTHIAZIDE 25 MG/1
25 TABLET ORAL DAILY
Status: DISCONTINUED | OUTPATIENT
Start: 2020-05-21 | End: 2020-05-22 | Stop reason: HOSPADM

## 2020-05-20 RX ORDER — AMOXICILLIN 250 MG
1 CAPSULE ORAL 2 TIMES DAILY PRN
Status: DISCONTINUED | OUTPATIENT
Start: 2020-05-20 | End: 2020-05-22 | Stop reason: HOSPADM

## 2020-05-20 RX ORDER — POTASSIUM CHLORIDE 7.45 MG/ML
10 INJECTION INTRAVENOUS
Status: DISCONTINUED | OUTPATIENT
Start: 2020-05-20 | End: 2020-05-22 | Stop reason: HOSPADM

## 2020-05-20 RX ORDER — IBUPROFEN 600 MG/1
600 TABLET, FILM COATED ORAL EVERY 6 HOURS PRN
Status: DISCONTINUED | OUTPATIENT
Start: 2020-05-20 | End: 2020-05-22 | Stop reason: HOSPADM

## 2020-05-20 RX ORDER — ACETAMINOPHEN 325 MG/1
650 TABLET ORAL EVERY 4 HOURS PRN
Status: DISCONTINUED | OUTPATIENT
Start: 2020-05-20 | End: 2020-05-22 | Stop reason: HOSPADM

## 2020-05-20 RX ORDER — PROCHLORPERAZINE 25 MG
12.5 SUPPOSITORY, RECTAL RECTAL EVERY 12 HOURS PRN
Status: DISCONTINUED | OUTPATIENT
Start: 2020-05-20 | End: 2020-05-22 | Stop reason: HOSPADM

## 2020-05-20 RX ORDER — METHYLPREDNISOLONE SODIUM SUCCINATE 125 MG/2ML
125 INJECTION, POWDER, LYOPHILIZED, FOR SOLUTION INTRAMUSCULAR; INTRAVENOUS ONCE
Status: COMPLETED | OUTPATIENT
Start: 2020-05-20 | End: 2020-05-20

## 2020-05-20 RX ORDER — SODIUM CHLORIDE 9 MG/ML
1000 INJECTION, SOLUTION INTRAVENOUS CONTINUOUS
Status: DISCONTINUED | OUTPATIENT
Start: 2020-05-20 | End: 2020-05-21

## 2020-05-20 RX ORDER — PROCHLORPERAZINE MALEATE 5 MG
5 TABLET ORAL EVERY 6 HOURS PRN
Status: DISCONTINUED | OUTPATIENT
Start: 2020-05-20 | End: 2020-05-22 | Stop reason: HOSPADM

## 2020-05-20 RX ORDER — LISINOPRIL 20 MG/1
20 TABLET ORAL DAILY
Status: DISCONTINUED | OUTPATIENT
Start: 2020-05-21 | End: 2020-05-22 | Stop reason: HOSPADM

## 2020-05-20 RX ADMIN — SODIUM CHLORIDE 1000 ML: 9 INJECTION, SOLUTION INTRAVENOUS at 16:10

## 2020-05-20 RX ADMIN — VANCOMYCIN HYDROCHLORIDE 2000 MG: 1 INJECTION, POWDER, LYOPHILIZED, FOR SOLUTION INTRAVENOUS at 17:42

## 2020-05-20 RX ADMIN — METHYLPREDNISOLONE SODIUM SUCCINATE 125 MG: 125 INJECTION, POWDER, FOR SOLUTION INTRAMUSCULAR; INTRAVENOUS at 15:20

## 2020-05-20 RX ADMIN — SODIUM CHLORIDE 1000 ML: 9 INJECTION, SOLUTION INTRAVENOUS at 17:15

## 2020-05-20 RX ADMIN — FAMOTIDINE 20 MG: 20 INJECTION, SOLUTION INTRAVENOUS at 16:11

## 2020-05-20 RX ADMIN — ACETAMINOPHEN 500 MG: 500 TABLET, FILM COATED ORAL at 15:01

## 2020-05-20 RX ADMIN — DIPHENHYDRAMINE HYDROCHLORIDE 25 MG: 50 INJECTION INTRAMUSCULAR; INTRAVENOUS at 15:16

## 2020-05-20 RX ADMIN — SODIUM CHLORIDE 1000 ML: 9 INJECTION, SOLUTION INTRAVENOUS at 18:41

## 2020-05-20 RX ADMIN — IOHEXOL 100 ML: 350 INJECTION, SOLUTION INTRAVENOUS at 15:38

## 2020-05-20 RX ADMIN — CEFTRIAXONE SODIUM 1 G: 1 INJECTION, SOLUTION INTRAVENOUS at 16:56

## 2020-05-20 RX ADMIN — CEFTRIAXONE SODIUM 1 G: 1 INJECTION, SOLUTION INTRAVENOUS at 19:42

## 2020-05-20 ASSESSMENT — ENCOUNTER SYMPTOMS
TROUBLE SWALLOWING: 0
SINUS PAIN: 0
ABDOMINAL PAIN: 0
DIARRHEA: 0
DIZZINESS: 0
CHEST TIGHTNESS: 0
LIGHT-HEADEDNESS: 0
SPEECH DIFFICULTY: 0
EYE PAIN: 0
NAUSEA: 0
NECK PAIN: 0
DYSURIA: 0
HEADACHES: 0
COLOR CHANGE: 0
APPETITE CHANGE: 0
COUGH: 0
FREQUENCY: 0
STRIDOR: 0
FACIAL ASYMMETRY: 1
SHORTNESS OF BREATH: 0
FACIAL SWELLING: 1
ACTIVITY CHANGE: 0
CONSTIPATION: 0
BACK PAIN: 0
VOMITING: 0
TREMORS: 0
FATIGUE: 1
SEIZURES: 0
SORE THROAT: 0
NECK STIFFNESS: 0
WHEEZING: 0
WEAKNESS: 1
FEVER: 1
SINUS PRESSURE: 0
RHINORRHEA: 0

## 2020-05-20 ASSESSMENT — ACTIVITIES OF DAILY LIVING (ADL): ADLS_ACUITY_SCORE: 10

## 2020-05-20 ASSESSMENT — MIFFLIN-ST. JEOR: SCORE: 1374.6

## 2020-05-20 NOTE — PROGRESS NOTES
IV Contrast- Discharge Instructions After Your CT Scan      The IV contrast you received today will be filtered from your bloodstream by your kidneys during the next 24 hours and pass from the body in urine.  You will not be aware of this process and your urine will not change in color.  To help this process you should drink at least 4 additional glasses of water or juice today.  This reduces stress on your kidneys.    Most contrast reactions are immediate.  Should you develop symptoms of concern after discharge, contact the department at the number below.  After hours you should contact your personal physician.  If you develop breathing distress or wheezing, call 911.      1.  Has the patient had a previous reaction to IV contrast? no    2.  Does the patient have kidney disease? non    3.  Is the patient on dialysis? no    If YES to any of these questions, exam will be reviewed with a Radiologist before administering contrast.

## 2020-05-20 NOTE — ED TRIAGE NOTES
drove patient to   ER. Two days of fatigue, and fever within the past hour, swelling to right side face started yesterday. No breathing or swallowing impairment.

## 2020-05-20 NOTE — PROGRESS NOTES
Pharmacy -- Admission Medication Reconciliation IN PROGRESS 24 hour note    Prior to admission (PTA) medications were reviewed and the patient's PTA medication list was updated.    Sources Consulted: [employer]  Attempted to call patient, she is still getting admitted to [facility]    The pharmacists will continue to gather information in morning.    Daya [place], [facility], 5/20/2020,  6:43 PM

## 2020-05-20 NOTE — PHARMACY-VANCOMYCIN DOSING SERVICE
Pharmacy Consult- Vancomycin Assessment    Steffany Altamirano is a 64 year old female admitted on 2020.    Vancomycin has been ordered per MD, for the indication of: skin and soft tissue infection of face    Current Antibiotic Regimen Includes: vancomycin per pharmacy    Patient Active Problem List   Diagnosis     Chronic dermatitis of hands     Displacement of cervical intervertebral disc without myelopathy     Diverticulosis of sigmoid colon     Dyshidrotic eczema     Hyperlipidemia     Hypertension     Lipoma of other skin and subcutaneous tissue     Obesity     Plantar fasciitis, bilateral     Abnormal glucose     Sjogren's syndrome (H)     Umbilical hernia     Obesity (BMI 35.0-39.9) with comorbidity (H)       Allergies (and reaction): Adhesive tape; Latex; and Liquid adhesive    Most recent flowsheet Weight: 89.4 kg (197 lb)  Most recent flowsheet      No intake or output data in the 24 hours ending 20 1617    Tmax = Temp (24hrs), Av.9  F (38.8  C), Min:101.9  F (38.8  C), Max:101.9  F (38.8  C)      Recent Labs   Lab Test 20  1515   WBC 14.1*       Recent Labs   Lab Test 20  1515 20  1103 18  1610   BUN 12 13 16   CR 0.87 0.80 0.84       estimated creatinine clearance is 67 mL/min (based on SCr of 0.87 mg/dL).    Cultures Pending: blood x2     Culture Results: none    Plan: Give vancomycin 2000 mg (~20 mg/kg/dose)    Further dosing per provider's plan.  Goal Trough: 15-20 mg/L    Thank You for the consult. Will continue to follow.    Daya Montana [facility] ....................  2020   4:17 PM

## 2020-05-20 NOTE — H&P
[facility]    History and Physical  Hospitalist       Date of Admission:  5/20/2020    Assessment & Plan   Steffany Altamirano is a 64 year old female who presents with nonnecrotizing soft tissue infection of the face versus a viral exanthem.    Principal Problem:    Facial cellulitis    Assessment: Given her elevated white count and objective fever as well as erythema starting on the right maxilla and spreading clockwise raises the suspicion for bacterial process.  However given the lack of swelling or pain also possible viral component although benefit of empiric antibiotic treatment outweighs risk.    Plan:   IV ceftriaxone day 1  Follow-up blood cultures  Follow-up MRSA nasal swab  Ibuprofen and Tylenol as needed    Active Problems:    Hypertension    Assessment: Stable and well controlled    Plan: Resume home regimen in a.m.    FEN: regular diet, normal saline at 100 mL/hr, mg/k replacement protocol  PPX: early ambulation    Code Status: Full Code    Cameron Early    Primary Care Physician   Cece Freeman    Chief Complaint   Fever and rash on face    History is obtained from the patient and chart review.    History of Present Illness   Steffany Altamirano is a 64 year old female who presents with nonnecrotizing soft tissue infection of the face versus a viral exanthem.    Patient has felt poorly over the last 48 hours with fevers and chills as well as rash that developed on her right maxilla.  She not had trauma, insect bites or other new exposures of topical products to her face.  Rash is spread in a clockwise fashion from the right maxilla over her forehead to the left maxilla with no pustules or pain.  Her appetite has been very poor and given the expanding rash she subsequently presented to the ER.    In the ER she was noted to be febrile, had a leukocytosis, underwent CT of head and chest, was started on IV ceftriaxone and given the concern for progressive bacterial facial cellulitis was  subsequently admitted for further management.      Past Medical History    I have reviewed this patient's medical history and updated it with pertinent information if needed.   Past Medical History:   Diagnosis Date     Essential (primary) hypertension     Hypertension     Prediabetes     No Comments Provided     Sicca syndrome (H)     Possible Sjogren's       Past Surgical History   I have reviewed this patient's surgical history and updated it with pertinent information if needed.  Past Surgical History:   Procedure Laterality Date     ARTHROSCOPY SHOULDER ROTATOR CUFF REPAIR Right     Dr. Sweet      SECTION  1972    1973,Epigastric hernia repair by [facility]     CHOLECYSTECTOMY  1984     COLONOSCOPY  2014    F/U  hyperplastic     HERNIA REPAIR  10/25/2002    Epigastric hernia repair by [facility]     HYSTERECTOMY TOTAL ABDOMINAL, BILATERAL SALPINGO-OOPHORECTOMY, COMBINED  1996    Supracervical hysterectomy and bilateral oophorectomy     LAPAROSCOPIC TUBAL LIGATION            Resection of lipoma of LUQ  [id number]            Prior to Admission Medications   Prior to Admission Medications   Prescriptions Last Dose Informant Patient Reported? Taking?   levothyroxine (SYNTHROID/LEVOTHROID) 50 MCG tablet 2020 at Unknown time  No Yes   Sig: Take 1 tablet (50 mcg) by mouth every morning (before breakfast)   lisinopril-hydrochlorothiazide (PRINZIDE/ZESTORETIC) 20-25 MG tablet 2020 at Unknown time  No Yes   Sig: Take 1 tablet by mouth daily   magnesium oxide (MAG-OX) 400 MG tablet More than a month at Unknown time  Yes No   Sig: Take 1 tablet by mouth daily   potassium chloride ER (K-DUR/KLOR-CON M) 20 MEQ CR tablet 2020 at Unknown time  No Yes   Sig: Take 1 tablet (20 mEq) by mouth daily with food   rizatriptan (MAXALT-MLT) 5 MG ODT More than a month at Unknown time  No No   Sig: Take 1 tablet (5 mg) by mouth 2 times daily as needed for migraine Give at minimum 2 hrs apart. Max Dose: 30 mg  per 24 hrs      Facility-Administered Medications: None     Allergies   Allergies   Allergen Reactions     Adhesive Tape      Paper tape     Latex Blisters     Liquid Adhesive      Other reaction(s): Contact Dermatitis       Social History   I have reviewed this patient's social history and updated it with pertinent information if needed. Steffany Altamirano  reports that she quit smoking about 23 years ago. Her smoking use included cigarettes. She has never used smokeless tobacco. She reports that she does not drink alcohol or use drugs.    Family History   I have reviewed this patient's family history and updated it with pertinent information if needed.   Family History   Problem Relation Age of Onset     Cancer Father         Cancer,metastatic cancer, unknown primary     Diabetes Father         Diabetes     Other - See Comments Father         Stroke     Arthritis Mother         Arthritis,Born 1942. degenerative arthritis     Diabetes Brother         Diabetes     Heart Disease Brother 38        Heart Disease,MI     Hyperlipidemia Sister         Hyperlipidemia,high cholesterol-     Hypertension Sister         Hypertension     Breast Cancer Maternal Aunt         Cancer-breast       Review of Systems     REVIEW OF SYSTEMS:    Constitutional: normal energy and appetite, no recent sick contacts, no influenza-like illness symptoms.  Eyes: no changes in vision, no pain with movement of her eyes in any direction, no headaches.  Ears, nose, mouth, throat, and face: no mouth sores, dysphagia, or odynophagia  Respiratory: no shortness of breath, cough, or wheezing.   Cardiovascular: no chest pain, palpitations, orthopnea, increased lower extremity edema, or syncope.   Gastrointestinal: no constipation, diarrhea, nausea, vomiting or abdominal pain.  Genitourinary: no dysuria, hematuria, urgency or frequency.   Hematologic/lymphatic: no unintentional weight loss or night sweats.  Musculoskeletal: no pain to extremities or falls.    Endocrine:  not a known diabetic.   Skin: No other skin sores or rashes in any other location on her body.  No tick bites or other insect bites.  No new uses of lotions creams or other prior issues with contact dermatitis    Physical Exam   Temp: 98.4  F (36.9  C) Temp src: Tympanic BP: (!) 141/72 Pulse: 71   Resp: 14 SpO2: 95 % O2 Device: None (Room air)    Vital Signs with Ranges  Temp:  [98.4  F (36.9  C)-101.9  F (38.8  C)] 98.4  F (36.9  C)  Pulse:  [70-93] 71  Resp:  [12-14] 14  BP: (129-149)/(72-91) 141/72  SpO2:  [93 %-98 %] 95 %  197 lbs 0 oz    Exam:  GENERAL: Talkative, in no apparent distress.  Eyes: anicteric and non-injected sclera, pupils equal round and reactive to light.  Nose: no rhinorrhea or epistaxis.   Throat: moist mucous membranes with no active oral lesions.  NECK: Supple, jugular venous distension not present.  Shotty bilateral anterior chain adenopathy with mild pain with palpation but no overlying skin changes.  CARDIOVASCULAR: regular rate and rhythm, no murmurs, rubs, or gallops. Normal S1/S2. No lower extremity edema.   RESPIRATORY: clear to auscultation bilaterally, no wheezes, no crackles.  No accessory muscle use or evidence of respiratory distress.   GI: Obese abdomen, soft, non-tender, non-distended, normoactive bowel sounds.  MUSCULOSKELETAL: warm and well perfused, 2+ dorsalis pedis pulses.    SKIN: Erythematous nonraised rash extending from the right maxilla with a poorly demarcated border and hypertrophic epithelialization around her forehead and patches to her left maxilla without involvement of the eyelids.  No pustules fluctuance.  Slight erythema and epithelialization of the pinna of the right ear.  No other scabs or epithelial defects noted.  NEUROLOGY: AAOx3, follows commands, speech and language without focal deficits.     Data   Data reviewed today:  I personally reviewed no images or EKG's today.  Recent Labs   Lab 05/20/20  1515   WBC 14.1*   HGB 13.6   MCV 90       INR 1.35*   *   POTASSIUM 3.6   CHLORIDE 98   CO2 28   BUN 12   CR 0.87   ANIONGAP 7   PUSHPA 8.9   *   ALBUMIN 3.6   PROTTOTAL 6.8   BILITOTAL 0.9   ALKPHOS 56   ALT 19   AST 17       Recent Results (from the past 24 hour(s))   CT Head w/o Contrast    Narrative    PROCEDURE: CT HEAD W/O CONTRAST     HISTORY: angioedema.    COMPARISON: MR brain 12/6/2017.    TECHNIQUE:  Helical images of the head from the foramen magnum to the  vertex were obtained without contrast.    FINDINGS: The ventricles and sulci are normal in volume. No acute  intracranial hemorrhage, mass effect, midline shift, hydrocephalus or  basilar cystern effacement are present.    The grey-white matter interface is preserved.    The calvarium is intact. The mastoid air cells are clear.  The  visualized paranasal sinuses are clear.      Impression    IMPRESSION: Negative CT head.      RACHELL EDWARDS MD   CT Chest w Contrast    Narrative    CT CHEST W CONTRAST  5/20/2020 3:37 PM    CLINICAL HISTORY: Female, age 64 years,  fever, possible COVID;    Comparison:  None    TECHNIQUE:  CT was performed of the chest  with IV contrast.   Sagittal, coronal, axial and MIP reconstructions were reviewed.     FINDINGS:  Chest CT:   Lungs : Aside from minimal dependent atelectasis, lungs are clear.  Thyroid: The visualized portions are normal.  Heart and Great Vessels:  Normal.  Lymph Nodes:  Normal.  Pleura: Normal.  Bony Structures:  Mild degenerative changes of the thoracic spine.  Esophagus/stomach: Normal.    Visualized portions of the upper abdomen abdomen:  Demonstrate a 3.5  cm cortical cyst upper pole right kidney. Gallbladder is surgically  absent.      Impression    IMPRESSION:   Minimal dependent atelectasis in the lungs without evidence of viral  pneumonia.     3.5 similar cortical cyst posteriorly right kidney.    DOROTA STARK MD

## 2020-05-20 NOTE — ED PROVIDER NOTES
History     Chief Complaint   Patient presents with     Fever     tired and facial swelling     HPI  Steffany Altamirano is a 64 year old female who has been feeling more fatigued and lethargic over the past couple days.  Last night she developed some sudden swelling to the right side of her face and this seems to have progressed to bilateral frontal facial area.  Denies any visual changes.  Denies any sinus drainage or discharge.  Denies any ear pain.  No trouble breathing.  No sore throat, cough or flulike symptoms.  She spiked a fever today and presents with a temp of 101.9.  Denies any difficulty swallowing.  No glottis effect.  She has had a slight cough but no more than usual.  Her initial SaO2 is noted at 94% on room air.  Patient is placed in COVID precautions based on RN evaluation.    Allergies:  Allergies   Allergen Reactions     Adhesive Tape      Paper tape     Latex Blisters     Liquid Adhesive      Other reaction(s): Contact Dermatitis       Problem List:    Patient Active Problem List    Diagnosis Date Noted     Obesity (BMI 35.0-39.9) with comorbidity (H) 11/29/2018     Priority: Medium     Displacement of cervical intervertebral disc without myelopathy 01/24/2018     Priority: Medium     Hyperlipidemia 01/24/2018     Priority: Medium     Hypertension 01/24/2018     Priority: Medium     Obesity 01/24/2018     Priority: Medium     Chronic dermatitis of hands 03/24/2016     Priority: Medium     Diverticulosis of sigmoid colon 01/20/2014     Priority: Medium     Lipoma of other skin and subcutaneous tissue 01/29/2013     Priority: Medium     Dyshidrotic eczema 06/22/2012     Priority: Medium     Abnormal glucose 03/15/2012     Priority: Medium     Umbilical hernia 02/27/2012     Priority: Medium     Sjogren's syndrome (H) 01/26/2012     Priority: Medium     Plantar fasciitis, bilateral 10/21/2010     Priority: Medium        Past Medical History:    Past Medical History:   Diagnosis Date     Essential  (primary) hypertension      Prediabetes      Sicca syndrome (H)        Past Surgical History:    Past Surgical History:   Procedure Laterality Date     ARTHROSCOPY SHOULDER ROTATOR CUFF REPAIR Right 1998    Dr. Sweet      SECTION  1972    1973,Epigastric hernia repair by [facility]     CHOLECYSTECTOMY  1984     COLONOSCOPY  2014    F/U  hyperplastic     HERNIA REPAIR  10/25/2002    Epigastric hernia repair by [facility]     HYSTERECTOMY TOTAL ABDOMINAL, BILATERAL SALPINGO-OOPHORECTOMY, COMBINED  1996    Supracervical hysterectomy and bilateral oophorectomy     LAPAROSCOPIC TUBAL LIGATION            Resection of lipoma of LUQ  [id number]            Family History:    Family History   Problem Relation Age of Onset     Cancer Father         Cancer,metastatic cancer, unknown primary     Diabetes Father         Diabetes     Other - See Comments Father         Stroke     Arthritis Mother         Arthritis,Born [birth year]. degenerative arthritis     Diabetes Brother         Diabetes     Heart Disease Brother 38        Heart Disease,MI     Hyperlipidemia Sister         Hyperlipidemia,high cholesterol-     Hypertension Sister         Hypertension     Breast Cancer Maternal Aunt         Cancer-breast       Social History:  Marital Status:   [2]  Social History     Tobacco Use     Smoking status: Former Smoker     Types: Cigarettes     Last attempt to quit: 1997     Years since quittin.2     Smokeless tobacco: Never Used   Substance Use Topics     Alcohol use: No     Alcohol/week: 0.0 standard drinks     Drug use: No        Medications:    levothyroxine (SYNTHROID/LEVOTHROID) 50 MCG tablet  lisinopril-hydrochlorothiazide (PRINZIDE/ZESTORETIC) 20-25 MG tablet  potassium chloride ER (K-DUR/KLOR-CON M) 20 MEQ CR tablet  magnesium oxide (MAG-OX) 400 MG tablet  rizatriptan (MAXALT-MLT) 5 MG ODT          Review of Systems   Constitutional: Positive for fatigue and fever. Negative for activity change and appetite  change.   HENT: Positive for facial swelling. Negative for drooling, mouth sores, rhinorrhea, sinus pressure, sinus pain, sore throat and trouble swallowing.    Eyes: Negative for pain and visual disturbance.   Respiratory: Negative for cough, chest tightness, shortness of breath, wheezing and stridor.    Cardiovascular: Negative for chest pain and leg swelling.   Gastrointestinal: Negative for abdominal pain, constipation, diarrhea, nausea and vomiting.   Genitourinary: Negative for dysuria, frequency and urgency.   Musculoskeletal: Negative for back pain, neck pain and neck stiffness.   Skin: Negative for color change.   Neurological: Positive for facial asymmetry and weakness. Negative for dizziness, tremors, seizures, speech difficulty, light-headedness and headaches.       Physical Exam   BP: (!) 149/86  Pulse: 93  Temp: 101.9  F (38.8  C)  Resp: 12  Weight: 89.4 kg (197 lb)  SpO2: 94 %      Physical Exam  Constitutional:       General: She is not in acute distress.     Appearance: She is not ill-appearing, toxic-appearing or diaphoretic.   HENT:      Head: No raccoon eyes or Mohan's sign.      Jaw: No trismus.        Comments: Frontal angioedema and erythema which extends from her cheek areas up into her scalp more pronounced on the right than the left.  But bilateral.  Warm to the touch.  Unable to appreciate any significant open wound.     Right Ear: No drainage or tenderness. No middle ear effusion. Tympanic membrane is injected, erythematous and bulging.      Left Ear: No drainage or tenderness.  No middle ear effusion. Tympanic membrane is injected and bulging. Tympanic membrane is not erythematous.      Nose: Nose normal. No nasal deformity, septal deviation or mucosal edema.      Right Nostril: No epistaxis or occlusion.      Left Nostril: No epistaxis or occlusion.   Eyes:      General: No scleral icterus.     Extraocular Movements: Extraocular movements intact.      Right eye: Normal extraocular  motion and no nystagmus.      Left eye: Normal extraocular motion and no nystagmus.      Conjunctiva/sclera:      Right eye: Right conjunctiva is not injected. No exudate.     Left eye: Left conjunctiva is not injected. No exudate.     Pupils: Pupils are equal, round, and reactive to light.      Right eye: Pupil is reactive and not sluggish.      Left eye: Pupil is reactive and not sluggish.      Funduscopic exam:     Right eye: No AV nicking, arteriolar narrowing or papilledema. Red reflex present.         Left eye: No AV nicking, arteriolar narrowing or papilledema. Red reflex present.  Neck:      Musculoskeletal: Normal range of motion. Normal range of motion. No neck rigidity, pain with movement, spinous process tenderness or muscular tenderness.      Vascular: No JVD.      Trachea: No tracheal deviation.   Cardiovascular:      Rate and Rhythm: Normal rate and regular rhythm.   Pulmonary:      Effort: Pulmonary effort is normal. No respiratory distress.      Breath sounds: Normal breath sounds. No stridor. No wheezing.   Abdominal:      General: There is no distension.      Palpations: There is no mass.      Tenderness: There is no abdominal tenderness. There is no right CVA tenderness, left CVA tenderness, guarding or rebound.   Musculoskeletal: Normal range of motion.         General: No tenderness or deformity.   Lymphadenopathy:      Cervical: No cervical adenopathy.      Right cervical: No superficial cervical adenopathy.     Left cervical: No superficial cervical adenopathy.   Skin:     General: Skin is warm and dry.      Capillary Refill: Capillary refill takes less than 2 seconds.   Neurological:      Mental Status: She is alert and oriented to person, place, and time.      GCS: GCS eye subscore is 4. GCS verbal subscore is 5. GCS motor subscore is 6.      Motor: No tremor or seizure activity.      Coordination: Coordination normal.      Gait: Gait normal.         ED Course       Results for orders  placed or performed during the hospital encounter of 05/20/20 (from the past 24 hour(s))   UA reflex to Microscopic   Result Value Ref Range    Color Urine Yellow     Appearance Urine Clear     Glucose Urine Negative NEG^Negative mg/dL    Bilirubin Urine Negative NEG^Negative    Ketones Urine 40 (A) NEG^Negative mg/dL    Specific Gravity Urine 1.025 1.003 - 1.035    Blood Urine Small (A) NEG^Negative    pH Urine 5.0 5.0 - 7.0 pH    Protein Albumin Urine 10 (A) NEG^Negative mg/dL    Urobilinogen mg/dL Normal 0.0 - 2.0 mg/dL    Nitrite Urine Negative NEG^Negative    Leukocyte Esterase Urine Moderate (A) NEG^Negative    Source Midstream Urine     RBC Urine 2 0 - 2 /HPF    WBC Urine 21 (H) 0 - 5 /HPF    Squamous Epithelial /HPF Urine 5 (H) 0 - 1 /HPF    Mucous Urine Present (A) NEG^Negative /LPF   CBC with platelets differential   Result Value Ref Range    WBC 14.1 (H) 4.0 - 11.0 10e9/L    RBC Count 4.57 3.8 - 5.2 10e12/L    Hemoglobin 13.6 11.7 - 15.7 g/dL    Hematocrit 41.0 35.0 - 47.0 %    MCV 90 78 - 100 fl    MCH 29.8 26.5 - 33.0 pg    MCHC 33.2 31.5 - 36.5 g/dL    RDW 12.7 10.0 - 15.0 %    Platelet Count 175 150 - 450 10e9/L    Diff Method Automated Method     % Neutrophils 87.2 %    % Lymphocytes 5.3 %    % Monocytes 6.5 %    % Eosinophils 0.0 %    % Basophils 0.4 %    % Immature Granulocytes 0.6 %    Absolute Neutrophil 12.3 (H) 1.6 - 8.3 10e9/L    Absolute Lymphocytes 0.7 (L) 0.8 - 5.3 10e9/L    Absolute Monocytes 0.9 0.0 - 1.3 10e9/L    Absolute Eosinophils 0.0 0.0 - 0.7 10e9/L    Absolute Basophils 0.1 0.0 - 0.2 10e9/L    Abs Immature Granulocytes 0.1 0 - 0.4 10e9/L   INR   Result Value Ref Range    INR 1.35 (H) 0 - 1.3   Comprehensive metabolic panel   Result Value Ref Range    Sodium 133 (L) 134 - 144 mmol/L    Potassium 3.6 3.5 - 5.1 mmol/L    Chloride 98 98 - 107 mmol/L    Carbon Dioxide 28 21 - 31 mmol/L    Anion Gap 7 3 - 14 mmol/L    Glucose 124 (H) 70 - 105 mg/dL    Urea Nitrogen 12 7 - 25 mg/dL     Creatinine 0.87 0.60 - 1.20 mg/dL    GFR Estimate 65 >60 mL/min/[1.73_m2]    GFR Estimate If Black 79 >60 mL/min/[1.73_m2]    Calcium 8.9 8.6 - 10.3 mg/dL    Bilirubin Total 0.9 0.3 - 1.0 mg/dL    Albumin 3.6 3.5 - 5.7 g/dL    Protein Total 6.8 6.4 - 8.9 g/dL    Alkaline Phosphatase 56 34 - 104 U/L    ALT 19 7 - 52 U/L    AST 17 13 - 39 U/L   Lactic acid whole blood   Result Value Ref Range    Lactic Acid 1.0 0.7 - 2.0 mmol/L   Troponin GH   Result Value Ref Range    Troponin 6.7 <34.0 pg/mL   Nt probnp inpatient (BNP)   Result Value Ref Range    N-Terminal Pro BNP Inpatient 37 0 - 100 pg/mL   CRP inflammation   Result Value Ref Range    CRP Inflammation 15.9 (H) <0.5 mg/L   Erythrocyte sedimentation rate auto   Result Value Ref Range    Sed Rate 20 (H) 1 - 15 mm/h   Symptomatic COVID-19 Virus (Coronavirus) by PCR    Specimen: Nasopharyngeal   Result Value Ref Range    COVID-19 Virus PCR to [facility] - Source Nasopharyngeal     COVID-19 Virus PCR to [facility] - Result       Test received-See reflex to [place] test SARS CoV2 (COVID-19) Virus RT-PCR   SARS-CoV-2 COVID-19 Virus (Coronavirus) RT-PCR Nasopharyngeal    Specimen: Nasopharyngeal   Result Value Ref Range    SARS-CoV-2 Virus Specimen Source Nasopharyngeal     SARS-CoV-2 PCR Result NEGATIVE     SARS-CoV-2 PCR Comment       This automated, real-time RT-PCR assay by [employer] on the [employer] has   been given Emergency Use Authorization (EUA) for the in vitro qualitative detection of RNA   from the SARS-CoV2 virus in nasopharyngeal swabs in viral transport medium from patients   with signs and symptoms of infection who are suspected of COVID-19. The performance is   unknown in asymptomatic patients.     CT Head w/o Contrast    Narrative    PROCEDURE: CT HEAD W/O CONTRAST     HISTORY: angioedema.    COMPARISON: MR brain 12/6/2017.    TECHNIQUE:  Helical images of the head from the foramen magnum to the  vertex were obtained without  contrast.    FINDINGS: The ventricles and sulci are normal in volume. No acute  intracranial hemorrhage, mass effect, midline shift, hydrocephalus or  basilar cystern effacement are present.    The grey-white matter interface is preserved.    The calvarium is intact. The mastoid air cells are clear.  The  visualized paranasal sinuses are clear.      Impression    IMPRESSION: Negative CT head.      RACHELL EDWARDS MD   CT Chest w Contrast    Narrative    CT CHEST W CONTRAST  5/20/2020 3:37 PM    CLINICAL HISTORY: Female, age 64 years,  fever, possible COVID;    Comparison:  None    TECHNIQUE:  CT was performed of the chest  with IV contrast.   Sagittal, coronal, axial and MIP reconstructions were reviewed.     FINDINGS:  Chest CT:   Lungs : Aside from minimal dependent atelectasis, lungs are clear.  Thyroid: The visualized portions are normal.  Heart and Great Vessels:  Normal.  Lymph Nodes:  Normal.  Pleura: Normal.  Bony Structures:  Mild degenerative changes of the thoracic spine.  Esophagus/stomach: Normal.    Visualized portions of the upper abdomen abdomen:  Demonstrate a 3.5  cm cortical cyst upper pole right kidney. Gallbladder is surgically  absent.      Impression    IMPRESSION:   Minimal dependent atelectasis in the lungs without evidence of viral  pneumonia.     3.5 similar cortical cyst posteriorly right kidney.    DOROTA STARK MD       Medications   0.9% sodium chloride BOLUS (has no administration in time range)     Followed by   sodium chloride 0.9% infusion (has no administration in time range)   famotidine (PEPCID) infusion 20 mg (has no administration in time range)   iohexol (OMNIPAQUE) 350 mg/mL solution 100 mL (has no administration in time range)   methylPREDNISolone sodium succinate (solu-MEDROL) injection 125 mg (125 mg Intravenous Given 5/20/20 1520)   diphenhydrAMINE (BENADRYL) injection 25 mg (25 mg Intravenous Given 5/20/20 1516)   acetaminophen (TYLENOL) tablet 500 mg (500 mg  Oral Given 5/20/20 1501)       Assessments & Plan (with Medical Decision Making)     I have reviewed the nursing notes.    I have reviewed the findings, diagnosis, plan and need for follow up with the patient.       ED to Inpatient Handoff:    Discussed with Dr. Early at [facility]  Patient accepted for Inpatient Stay  Pending studies include blood cultures  Code Status: Not Addressed           New Prescriptions    No medications on file       Final diagnoses:   Cellulitis, face   Angioedema, initial encounter   Fever     Patient placed in COVID precautions and isolation initially.  Febrile temp 101.9 initially.  She was given oral Tylenol for pain relief and fever reduction.  Sudden onset of facial swelling and redness.  Well demarcated.  IV established and she was given fluids, Solu-Medrol and Benadryl initially.  Cover test is pending.  CBC shows elevated white blood cells at 14.1 with left shift.  Blood cultures are pending.  Lactate is normal.  CRP is elevated at 15.9.  ESR is also elevated at 20.  BNP is normal.  Troponin is normal.  CMP shows minimal decrease of sodium at 133 but otherwise unremarkable.  UA shows only minimal increase in white blood cells at 21 moderate amount of leuko-site esterase.  But also show some squamous cells are most likely a contamination.  CT of her head shows no acute findings.  CT of her chest with contrast shows no viral infection, some mild atelectasis of the lower lobes.  I discussed this case with  patient was started on Rocephin and will be admitted at this time for further medical management and evaluation.  There was some initial consideration to vancomycin but we will hold off at this time.  5/20/2020   [facility]     Kameron Patino PA-C  05/20/20 0232

## 2020-05-20 NOTE — PROGRESS NOTES
Chart accessed in anticipation of admission to the floor. Patient possibly will not be admitted before end of shift.

## 2020-05-21 LAB
ANION GAP SERPL CALCULATED.3IONS-SCNC: 8 MMOL/L (ref 3–14)
BUN SERPL-MCNC: 15 MG/DL (ref 7–25)
CALCIUM SERPL-MCNC: 8.9 MG/DL (ref 8.6–10.3)
CHLORIDE SERPL-SCNC: 103 MMOL/L (ref 98–107)
CO2 SERPL-SCNC: 27 MMOL/L (ref 21–31)
CREAT SERPL-MCNC: 0.81 MG/DL (ref 0.6–1.2)
ERYTHROCYTE [DISTWIDTH] IN BLOOD BY AUTOMATED COUNT: 12.7 % (ref 10–15)
GFR SERPL CREATININE-BSD FRML MDRD: 71 ML/MIN/{1.73_M2}
GLUCOSE SERPL-MCNC: 222 MG/DL (ref 70–105)
HCT VFR BLD AUTO: 40.6 % (ref 35–47)
HGB BLD-MCNC: 13.3 G/DL (ref 11.7–15.7)
INR PPP: 1.23 (ref 0–1.3)
MAGNESIUM SERPL-MCNC: 2.1 MG/DL (ref 1.9–2.7)
MCH RBC QN AUTO: 29.4 PG (ref 26.5–33)
MCHC RBC AUTO-ENTMCNC: 32.8 G/DL (ref 31.5–36.5)
MCV RBC AUTO: 90 FL (ref 78–100)
PLATELET # BLD AUTO: 177 10E9/L (ref 150–450)
POTASSIUM SERPL-SCNC: 3.4 MMOL/L (ref 3.5–5.1)
RBC # BLD AUTO: 4.53 10E12/L (ref 3.8–5.2)
SODIUM SERPL-SCNC: 138 MMOL/L (ref 134–144)
WBC # BLD AUTO: 10.6 10E9/L (ref 4–11)

## 2020-05-21 PROCEDURE — 25000128 H RX IP 250 OP 636: Performed by: INTERNAL MEDICINE

## 2020-05-21 PROCEDURE — 80048 BASIC METABOLIC PNL TOTAL CA: CPT | Performed by: INTERNAL MEDICINE

## 2020-05-21 PROCEDURE — 12000000 ZZH R&B MED SURG/OB

## 2020-05-21 PROCEDURE — 25000132 ZZH RX MED GY IP 250 OP 250 PS 637: Performed by: INTERNAL MEDICINE

## 2020-05-21 PROCEDURE — 25800030 ZZH RX IP 258 OP 636: Performed by: INTERNAL MEDICINE

## 2020-05-21 PROCEDURE — 85027 COMPLETE CBC AUTOMATED: CPT | Performed by: INTERNAL MEDICINE

## 2020-05-21 PROCEDURE — 99232 SBSQ HOSP IP/OBS MODERATE 35: CPT | Performed by: INTERNAL MEDICINE

## 2020-05-21 PROCEDURE — 36415 COLL VENOUS BLD VENIPUNCTURE: CPT | Performed by: INTERNAL MEDICINE

## 2020-05-21 PROCEDURE — 83735 ASSAY OF MAGNESIUM: CPT | Performed by: INTERNAL MEDICINE

## 2020-05-21 PROCEDURE — 85610 PROTHROMBIN TIME: CPT | Performed by: INTERNAL MEDICINE

## 2020-05-21 RX ADMIN — SODIUM CHLORIDE 1000 ML: 9 INJECTION, SOLUTION INTRAVENOUS at 05:02

## 2020-05-21 RX ADMIN — ACETAMINOPHEN 650 MG: 325 TABLET ORAL at 03:13

## 2020-05-21 RX ADMIN — CEFTRIAXONE 2 G: 2 INJECTION, SOLUTION INTRAVENOUS at 09:08

## 2020-05-21 RX ADMIN — ACETAMINOPHEN 650 MG: 325 TABLET ORAL at 11:42

## 2020-05-21 RX ADMIN — HYDROCHLOROTHIAZIDE 25 MG: 25 TABLET ORAL at 09:06

## 2020-05-21 RX ADMIN — LEVOTHYROXINE SODIUM 50 MCG: 50 TABLET ORAL at 07:40

## 2020-05-21 RX ADMIN — LISINOPRIL 20 MG: 20 TABLET ORAL at 09:06

## 2020-05-21 ASSESSMENT — ACTIVITIES OF DAILY LIVING (ADL)
ADLS_ACUITY_SCORE: 10

## 2020-05-21 ASSESSMENT — MIFFLIN-ST. JEOR: SCORE: 1381.86

## 2020-05-21 NOTE — PROGRESS NOTES
SAFETY CHECKLIST  ID Bands and Risk clasps correct and in place (DNR, Fall risk, Allergy, Latex, Limb):  Yes  All Lines Reconciled and labeled correctly: Yes  Whiteboard updated:Yes  Environmental interventions (bed/chair alarm on, call light, side rails, restraints, sitter....): Yes    Amber Strickland RN on 5/20/2020 at 7:21 PM

## 2020-05-21 NOTE — PLAN OF CARE
"Patient is alert and oriented x 4. Reports a headache of a level 3/10. Tylenol administered per MAR. Follow up pain level was a 0/10. Lung sounds are clear and equal bilaterally. Respirations 16. SpO2 93-95% RA. Temp of 97.7-98.4. +2 edema to face and forehead. Patient reported \"it feels like the swelling is going down.\"  Patient is up independently in room. Voiding without difficulty. Vital signs:  Temp: 97.8  F (36.6  C) Temp src: Tympanic BP: 134/51 Pulse: 55   Resp: 16 SpO2: 93 % O2 Device: None (Room air)   Height: 157.5 cm (5' 2\") Weight: 88.4 kg (194 lb 12.8 oz)  Estimated body mass index is 35.63 kg/m  as calculated from the following:    Height as of this encounter: 1.575 m (5' 2\").    Weight as of this encounter: 88.4 kg (194 lb 12.8 oz).    Amber Strickland RN on 5/21/2020 at 4:26 AM           "

## 2020-05-21 NOTE — PLAN OF CARE
Steffany Altamirano is here for concerns of facial cellulitis.     Rates pain 0/10. Lung sounds clear and equal bilaterally. Bowel sounds active. Heart rate bradycardic. Independent in room. Regular diet.     Will continue to monitor.   /60   Pulse 50   Temp 97.5  F (36.4  C) (Tympanic)   Resp 16   Wt 88.4 kg (194 lb 12.8 oz)   SpO2 96%     Yuly Hutchins RN 05/21/20 2:12 PM

## 2020-05-21 NOTE — PLAN OF CARE
"VSS. LS clear. BS active x4. +2 Edema to bilateral cheeks and forehead. Slight swelling and redness on both eyes. Denies pain. Reports some itchiness. Ice applied. Pt very pleasant. Independent in room.   BP (!) 151/61   Pulse 55   Temp 97.3  F (36.3  C) (Tympanic)   Resp 16   Ht 1.575 m (5' 2\")   Wt 88.4 kg (194 lb 12.8 oz)   SpO2 96%   BMI 35.63 kg/m      "

## 2020-05-21 NOTE — PHARMACY-ADMISSION MEDICATION HISTORY
"Pharmacy -- Admission Medication Reconciliation    Prior to admission (PTA) medications were reviewed and the patient's PTA medication list was updated.    Sources Consulted: [facility]    The reliability of this Medication Reconciliation is: Reliability: Reliable    The following significant changes were made:  Updated last home administration times, as reported by patient  -REMOVED Magnesium oxide  -Updated Rizatriptan to \"Has not started\"- patient states she has a supply at home, but has not had to use yet.    In addition, the patient's allergies were reviewed with the patient and updated as follows:   Allergies: Adhesive tape; Latex; and Liquid adhesive  Adhesive tape allergy updated to include reaction of blisters    The pharmacist has reviewed with the patient that all personal medications should be removed from the building or locked in the belongings safe.  Patient shall only take medications ordered by the physician and administered by the nursing staff.       Medication barriers identified: none noted   Medication adherence concerns: none noted   Understanding of emergency medications: N/A    Justino Fan RPH, 5/21/2020,  8:39 AM    "

## 2020-05-21 NOTE — PROGRESS NOTES
SAFETY CHECKLIST  ID Bands and Risk clasps correct and in place (DNR, Fall risk, Allergy, Latex, Limb):  Yes  All Lines Reconciled and labeled correctly: Yes  Whiteboard updated:Yes  Environmental interventions (bed/chair alarm on, call light, side rails, restraints, sitter....): Yes  Verify Tele #:

## 2020-05-21 NOTE — PROGRESS NOTES
[facility]    Hospitalist Progress Note      Assessment & Plan   Steffany Altamirano is a 64 year old female who was admitted on 5/20/2020.     Principal Problem:    Facial cellulitis    Assessment: Stable slightly improved but slight worsening erythema of the forehead area.  Now afebrile with resolved leukocytosis and decreased erythema of the right maxilla.  High suspicion for nonnecrotizing cellulitis of the face and less likely to have a viral exanthem.    Plan:   IV ceftriaxone day 2  Follow-up blood cultures  Follow-up MRSA nasal swab  Ibuprofen and Tylenol as needed     Active Problems:    Hypertension    Assessment: Stable and well controlled    Plan: Resume home regimen today.     FEN: regular diet, discontinue normal saline, mg/k replacement protocol  PPX: Lovenox     Code Status: Full Code    Cameron Lick    Interval History   Overnight no acute events and afebrile. Swelling of the face has improved, no headaches, but erythema has increased on her forehead and into her scalp but resolved on her cheeks. She overall feels better but not as quickly as she would like. No eye pain, headaches, and rash is now slightly itchy with no pustules or drainage anywhere.     -Data reviewed today: I reviewed all new labs and imaging results over the last 24 hours. I personally reviewed no images or EKG's today.    Physical Exam   Temp: 97.3  F (36.3  C) Temp src: Tympanic BP: (!) 151/61 Pulse: 55   Resp: 16 SpO2: 96 % O2 Device: None (Room air)    Vitals:    05/20/20 1423 05/20/20 1831 05/21/20 0337   Weight: 89.4 kg (197 lb) 87.6 kg (193 lb 3.2 oz) 88.4 kg (194 lb 12.8 oz)     Vital Signs with Ranges  Temp:  [97.3  F (36.3  C)-101.9  F (38.8  C)] 97.3  F (36.3  C)  Pulse:  [55-93] 55  Resp:  [12-16] 16  BP: (127-151)/(51-91) 151/61  SpO2:  [93 %-98 %] 96 %  I/O last 3 completed shifts:  In: 1687 [P.O.:700; I.V.:987]  Out: 2900 [Urine:2900]    Exam:  GENERAL: Talkative, in no apparent  distress.  CARDIOVASCULAR: regular rate and rhythm, no murmurs, rubs, or gallops. Normal S1/S2. No lower extremity edema.   RESPIRATORY: clear to auscultation bilaterally, no wheezes, no crackles.  GI: Obese abdomen, soft, non-tender, non-distended, normoactive bowel sounds.  MUSCULOSKELETAL: warm and well perfused, 2+ dorsalis pedis pulses.    SKIN: Erythematous nonraised rash extending from the right maxilla with a poorly demarcated border and hypertrophic epithelialization with decreased erythema today, more confluent on the forehead with ongoing and lightening patches to her left maxilla without involvement of the eyelids.  No pustules or fluctuance.  Now resolved slight erythema and epithelialization of the pinna of the right ear.  No other scabs or epithelial defects noted.     Medications       cefTRIAXone  2 g Intravenous Q24H     hydrochlorothiazide  25 mg Oral Daily     levothyroxine  50 mcg Oral QAM AC     lisinopril  20 mg Oral Daily     sodium chloride (PF)  3 mL Intracatheter Q8H       Data   Recent Labs   Lab 05/21/20  0408 05/20/20  1515   WBC 10.6 14.1*   HGB 13.3 13.6   MCV 90 90    175   INR 1.23 1.35*    133*   POTASSIUM 3.4* 3.6   CHLORIDE 103 98   CO2 27 28   BUN 15 12   CR 0.81 0.87   ANIONGAP 8 7   PUSHPA 8.9 8.9   * 124*   ALBUMIN  --  3.6   PROTTOTAL  --  6.8   BILITOTAL  --  0.9   ALKPHOS  --  56   ALT  --  19   AST  --  17       Recent Results (from the past 24 hour(s))   CT Head w/o Contrast    Narrative    PROCEDURE: CT HEAD W/O CONTRAST     HISTORY: angioedema.    COMPARISON: MR brain 12/6/2017.    TECHNIQUE:  Helical images of the head from the foramen magnum to the  vertex were obtained without contrast.    FINDINGS: The ventricles and sulci are normal in volume. No acute  intracranial hemorrhage, mass effect, midline shift, hydrocephalus or  basilar cystern effacement are present.    The grey-white matter interface is preserved.    The calvarium is intact. The  mastoid air cells are clear.  The  visualized paranasal sinuses are clear.      Impression    IMPRESSION: Negative CT head.      RACHELL EDWARDS MD   CT Chest w Contrast    Narrative    CT CHEST W CONTRAST  5/20/2020 3:37 PM    CLINICAL HISTORY: Female, age 64 years,  fever, possible COVID;    Comparison:  None    TECHNIQUE:  CT was performed of the chest  with IV contrast.   Sagittal, coronal, axial and MIP reconstructions were reviewed.     FINDINGS:  Chest CT:   Lungs : Aside from minimal dependent atelectasis, lungs are clear.  Thyroid: The visualized portions are normal.  Heart and Great Vessels:  Normal.  Lymph Nodes:  Normal.  Pleura: Normal.  Bony Structures:  Mild degenerative changes of the thoracic spine.  Esophagus/stomach: Normal.    Visualized portions of the upper abdomen abdomen:  Demonstrate a 3.5  cm cortical cyst upper pole right kidney. Gallbladder is surgically  absent.      Impression    IMPRESSION:   Minimal dependent atelectasis in the lungs without evidence of viral  pneumonia.     3.5 similar cortical cyst posteriorly right kidney.    DOROTA STARK MD

## 2020-05-22 VITALS
TEMPERATURE: 96.9 F | BODY MASS INDEX: 35.81 KG/M2 | OXYGEN SATURATION: 97 % | RESPIRATION RATE: 16 BRPM | DIASTOLIC BLOOD PRESSURE: 54 MMHG | SYSTOLIC BLOOD PRESSURE: 105 MMHG | HEART RATE: 72 BPM | WEIGHT: 194.6 LBS | HEIGHT: 62 IN

## 2020-05-22 LAB
BACTERIA SPEC CULT: NORMAL
ERYTHROCYTE [DISTWIDTH] IN BLOOD BY AUTOMATED COUNT: 13 % (ref 10–15)
ERYTHROCYTE [SEDIMENTATION RATE] IN BLOOD BY WESTERGREN METHOD: 14 MM/H (ref 1–15)
HCT VFR BLD AUTO: 37.3 % (ref 35–47)
HGB BLD-MCNC: 12.6 G/DL (ref 11.7–15.7)
MAGNESIUM SERPL-MCNC: 2.1 MG/DL (ref 1.9–2.7)
MCH RBC QN AUTO: 30.2 PG (ref 26.5–33)
MCHC RBC AUTO-ENTMCNC: 33.8 G/DL (ref 31.5–36.5)
MCV RBC AUTO: 89 FL (ref 78–100)
PLATELET # BLD AUTO: 217 10E9/L (ref 150–450)
POTASSIUM SERPL-SCNC: 3.5 MMOL/L (ref 3.5–5.1)
RBC # BLD AUTO: 4.17 10E12/L (ref 3.8–5.2)
SPECIMEN SOURCE: NORMAL
WBC # BLD AUTO: 17.3 10E9/L (ref 4–11)

## 2020-05-22 PROCEDURE — 85027 COMPLETE CBC AUTOMATED: CPT | Performed by: INTERNAL MEDICINE

## 2020-05-22 PROCEDURE — 99238 HOSP IP/OBS DSCHRG MGMT 30/<: CPT | Performed by: INTERNAL MEDICINE

## 2020-05-22 PROCEDURE — 84132 ASSAY OF SERUM POTASSIUM: CPT | Performed by: INTERNAL MEDICINE

## 2020-05-22 PROCEDURE — 25000132 ZZH RX MED GY IP 250 OP 250 PS 637: Performed by: INTERNAL MEDICINE

## 2020-05-22 PROCEDURE — 36415 COLL VENOUS BLD VENIPUNCTURE: CPT | Performed by: INTERNAL MEDICINE

## 2020-05-22 PROCEDURE — 85652 RBC SED RATE AUTOMATED: CPT | Performed by: INTERNAL MEDICINE

## 2020-05-22 PROCEDURE — 83735 ASSAY OF MAGNESIUM: CPT | Performed by: INTERNAL MEDICINE

## 2020-05-22 RX ORDER — CEFUROXIME AXETIL 500 MG/1
500 TABLET ORAL 2 TIMES DAILY
Qty: 16 TABLET | Refills: 0 | Status: SHIPPED | OUTPATIENT
Start: 2020-05-22 | End: 2020-05-30

## 2020-05-22 RX ADMIN — ACETAMINOPHEN 650 MG: 325 TABLET ORAL at 06:56

## 2020-05-22 RX ADMIN — LEVOTHYROXINE SODIUM 50 MCG: 50 TABLET ORAL at 07:53

## 2020-05-22 ASSESSMENT — ACTIVITIES OF DAILY LIVING (ADL)
ADLS_ACUITY_SCORE: 10

## 2020-05-22 ASSESSMENT — MIFFLIN-ST. JEOR: SCORE: 1380.95

## 2020-05-22 NOTE — DISCHARGE SUMMARY
[facility]    Discharge Summary  Hospitalist    Date of Admission:  5/20/2020  Date of Discharge:  5/22/2020  Discharging Provider: Cameron Early  Date of Service (when I saw the patient): 05/22/20    Discharge Diagnoses   Principal Problem:    Facial cellulitis (5/20/2020)  Active Problems:    Hypertension (1/24/2018)      History of Present Illness   Steffany Altamirano is a 64 year old female who presents with nonnecrotizing soft tissue infection of the face versus a viral exanthem.     Patient has felt poorly over the last 48 hours with fevers and chills as well as rash that developed on her right maxilla.  She not had trauma, insect bites or other new exposures of topical products to her face.  Rash is spread in a clockwise fashion from the right maxilla over her forehead to the left maxilla with no pustules or pain.  Her appetite has been very poor and given the expanding rash she subsequently presented to the ER.     In the ER she was noted to be febrile, had a leukocytosis, underwent CT of head and chest, was started on IV ceftriaxone and given the concern for progressive bacterial facial cellulitis was subsequently admitted for further management.    Hospital Course   Steffany Altamirano was admitted on 5/20/2020.  The following problems were addressed during her hospitalization:    Facial cellulitis: Patient was initially started on IV ceftriaxone given high suspicion for strep infection and no evidence of fluctuance or drainage and with slow progression of clinical course.  With this she remained afebrile, initial leukocytosis resolved however erythema spread slightly to her forehead on hospital day 1.  She remained in the hospital an extra day received ongoing ceftriaxone, remained afebrile, erythema started to resolve on the right cheek and forehead and regressed from her periorbital regions, swelling resolved nicely and she overall felt much better and was eager to discharge.  Of note her  white blood cell count did increase which did not clinically correlate.  Given her ongoing clinical improvement she will complete a full 10-day course of Ceftin, have close follow-up with her primary care provider as scheduled on day of discharge and consideration for repeat CBC to ensure normalization leukocytosis can be considered as outpatient.    She was also counseled regarding signs and symptoms warranting reevaluation in the ER, patient was otherwise very appreciative of care and all of the questions answered to the best of ability.    Cameron Early MD    Significant Results and Procedures   none    Pending Results   These results will be followed up by pcp  Unresulted Labs Ordered in the Past 30 Days of this Admission     Date and Time Order Name Status Description    5/20/2020 1624 Methicillin resistant staph aureus cult In process     5/20/2020 1438 Blood culture Preliminary     5/20/2020 1438 Blood culture Preliminary           Code Status   Full Code       Primary Care Physician   Cece Freeman    Physical Exam   Temp: 96.9  F (36.1  C) Temp src: Tympanic BP: 105/54 Pulse: 72   Resp: 16 SpO2: 97 % O2 Device: None (Room air)    Vitals:    05/20/20 1831 05/21/20 0337 05/22/20 0306   Weight: 87.6 kg (193 lb 3.2 oz) 88.4 kg (194 lb 12.8 oz) 88.3 kg (194 lb 9.6 oz)     Vital Signs with Ranges  Temp:  [96.9  F (36.1  C)-97.8  F (36.6  C)] 96.9  F (36.1  C)  Pulse:  [50-72] 72  Resp:  [16] 16  BP: (105-133)/(54-77) 105/54  SpO2:  [95 %-97 %] 97 %  I/O last 3 completed shifts:  In: 1400 [P.O.:1400]  Out: 250 [Urine:250]    Exam on day of discharge:  GENERAL: Talkative, in no apparent distress.  CARDIOVASCULAR: regular rate and rhythm, no murmurs, rubs, or gallops. Normal S1/S2. No lower extremity edema.   RESPIRATORY: clear to auscultation bilaterally, no wheezes, no crackles.  GI: Obese abdomen, soft, non-tender, non-distended, normoactive bowel sounds.  SKIN: Erythematous nonraised rash extending from the  right maxilla with a poorly demarcated border and now resolved hypertrophic epithelialization with significant decrease in erythema today.  Less confluent on the forehead with ongoing but lightening patches to her left maxilla without involvement of the eyelids.  No pustules or fluctuance.  Now resolved slight erythema and epithelialization of the pinna of the right ear.  Again, no other scabs or epithelial defects noted.     Discharge Disposition   Discharged to home  Condition at discharge: Stable    Consultations This Hospital Stay   PHARMACY TO DOSE VANCO    Time Spent on this Encounter   I, Cameron Early MD, personally saw the patient today and spent less than or equal to 30 minutes discharging this patient.    Discharge Orders      Reason for your hospital stay    Cellulitis of the face     Follow-up and recommended labs and tests     Dr. Freeman on tues. 5/26 @ 1pm at [facility]     Activity    Your activity upon discharge: activity as tolerated     Discharge Instructions    - take the antibiotic ceftin twice daily until it is gone to clear up the infection of your face. There are no other changes to your medications at this time.     When to contact your care team    Call your primary doctor if you have any of the following: temperature greater than 101,  increased shortness of breath, increased drainage, increased swelling or increased pain.     Diet    Follow this diet upon discharge: Orders Placed This Encounter      Combination Diet Regular Diet Adult     Discharge Medications   Discharge Medication List as of 5/22/2020  8:27 AM      START taking these medications    Details   cefuroxime (CEFTIN) 500 MG tablet Take 1 tablet (500 mg) by mouth 2 times daily for 8 days, Disp-16 tablet,R-0, E-Prescribe         CONTINUE these medications which have NOT CHANGED    Details   levothyroxine (SYNTHROID/LEVOTHROID) 50 MCG tablet Take 1 tablet (50 mcg) by mouth every morning (before breakfast), Disp-90 tablet,R-3,  E-Prescribe      lisinopril-hydrochlorothiazide (PRINZIDE/ZESTORETIC) 20-25 MG tablet Take 1 tablet by mouth daily, Disp-90 tablet,R-3, E-Prescribe      potassium chloride ER (K-DUR/KLOR-CON M) 20 MEQ CR tablet Take 1 tablet (20 mEq) by mouth daily with food, Disp-90 tablet,R-3, E-Prescribe      rizatriptan (MAXALT-MLT) 5 MG ODT Take 1 tablet (5 mg) by mouth 2 times daily as needed for migraine Give at minimum 2 hrs apart. Max Dose: 30 mg per 24 hrs, Disp-30 tablet,R-3, E-Prescribe           Allergies   Allergies   Allergen Reactions     Adhesive Tape Blisters     Paper tape     Latex Blisters     Liquid Adhesive      Other reaction(s): Contact Dermatitis     Data   Most Recent 3 CBC's:  Recent Labs   Lab Test 05/22/20  0425 05/21/20  0408 05/20/20  1515   WBC 17.3* 10.6 14.1*   HGB 12.6 13.3 13.6   MCV 89 90 90    177 175      Most Recent 3 BMP's:  Recent Labs   Lab Test 05/22/20  0425 05/21/20  0408 05/20/20  1515 02/04/20  1103   NA  --  138 133* 140   POTASSIUM 3.5 3.4* 3.6 4.0   CHLORIDE  --  103 98 104   CO2  --  27 28 30   BUN  --  15 12 13   CR  --  0.81 0.87 0.80   ANIONGAP  --  8 7 6   PUSHPA  --  8.9 8.9 9.6   GLC  --  222* 124* 102     Most Recent 2 LFT's:  Recent Labs   Lab Test 05/20/20  1515 02/04/20  1103   AST 17 14   ALT 19 17   ALKPHOS 56 63   BILITOTAL 0.9 0.7     Most Recent INR's and Anticoagulation Dosing History:  Anticoagulation Dose History     Recent Dosing and Labs Latest Ref Rng & Units 5/20/2020 5/21/2020    INR 0 - 1.3 1.35(H) 1.23        Most Recent 3 Troponin's:No lab results found.  Most Recent Cholesterol Panel:  Recent Labs   Lab Test 02/04/20  1103   CHOL 228*   *   HDL 77   TRIG 112     Most Recent 6 Bacteria Isolates From Any Culture (See EPIC Reports for Culture Details):  Recent Labs   Lab Test 05/20/20  1833 05/20/20  1515   CULT 10,000 to 50,000 colonies/mL  mixed urogenital kasey   No growth after 2 days  No growth after 2 days     Most Recent TSH, T4 and A1c  Labs:  Recent Labs   Lab Test 04/12/13  1735   T4 1.01     Results for orders placed or performed during the hospital encounter of 05/20/20   CT Head w/o Contrast    Narrative    PROCEDURE: CT HEAD W/O CONTRAST     HISTORY: angioedema.    COMPARISON: MR brain 12/6/2017.    TECHNIQUE:  Helical images of the head from the foramen magnum to the  vertex were obtained without contrast.    FINDINGS: The ventricles and sulci are normal in volume. No acute  intracranial hemorrhage, mass effect, midline shift, hydrocephalus or  basilar cystern effacement are present.    The grey-white matter interface is preserved.    The calvarium is intact. The mastoid air cells are clear.  The  visualized paranasal sinuses are clear.      Impression    IMPRESSION: Negative CT head.      RACHELL EDWARDS MD   CT Chest w Contrast    Narrative    CT CHEST W CONTRAST  5/20/2020 3:37 PM    CLINICAL HISTORY: Female, age 64 years,  fever, possible COVID;    Comparison:  None    TECHNIQUE:  CT was performed of the chest  with IV contrast.   Sagittal, coronal, axial and MIP reconstructions were reviewed.     FINDINGS:  Chest CT:   Lungs : Aside from minimal dependent atelectasis, lungs are clear.  Thyroid: The visualized portions are normal.  Heart and Great Vessels:  Normal.  Lymph Nodes:  Normal.  Pleura: Normal.  Bony Structures:  Mild degenerative changes of the thoracic spine.  Esophagus/stomach: Normal.    Visualized portions of the upper abdomen abdomen:  Demonstrate a 3.5  cm cortical cyst upper pole right kidney. Gallbladder is surgically  absent.      Impression    IMPRESSION:   Minimal dependent atelectasis in the lungs without evidence of viral  pneumonia.     3.5 similar cortical cyst posteriorly right kidney.    DOROTA STARK MD

## 2020-05-22 NOTE — PROGRESS NOTES
Discharge Note      Data:  Steffany Altamirano discharged to home at 0835 via ambulation. Accompanied by staff.    Action:  Written discharge/follow-up instructions were provided to patient. Prescriptions sent to patients preferred pharmacy. All belongings sent with patient.    Response:  Patient verbalized understanding of discharge instructions, reason for discharge, and necessary follow-up appointments.    Roxana Florentino RN on 5/22/2020 at 8:40 AM

## 2020-05-22 NOTE — PLAN OF CARE
"/77   Pulse 58   Temp 97.3  F (36.3  C) (Tympanic)   Resp 16   Ht 1.575 m (5' 2\")   Wt 88.4 kg (194 lb 12.8 oz)   SpO2 96%   BMI 35.63 kg/m    VSS, afebrile. Pt denies any pain. 2+edema noted on face, red in color, and warm to touch. Ice applied per pt request. Pt up ambulating in her room IND. Will continue monitor.   "

## 2020-05-22 NOTE — PHARMACY - DISCHARGE MEDICATION RECONCILIATION AND EDUCATION
Pharmacy:  Discharge Counseling and Medication Reconciliation    Steffany Altamirano  [address]   [place] [address]  [phone] (home)   64 year old female  PCP: Cece Dozier    Allergies: Adhesive tape; Latex; and Liquid adhesive    Discharge Counseling:    Pharmacist met with patient (and/or family) today to review the medication portion of the After Visit Summary (with an emphasis on NEW medications) and to address patient's questions/concerns.    Summary of Education: met with patient about new antibiotic.  Discussed administration, duration and side effects.  All questions answered.    Materials Provided:  MedCounselor sheets printed from Clinical Pharmacology on: cefuroxime    Discharge Medication Reconciliation:    It has been determined that the patient has an adequate supply of medications available or which can be obtained from the patient's preferred pharmacy, which HE/SHE has confirmed as: [facility]   Thank you for the consult.    Carmenza Smith RPH........May 22, 2020 8:29 AM

## 2020-05-24 LAB
BACTERIA SPEC CULT: NORMAL
SPECIMEN SOURCE: NORMAL

## 2020-05-26 ENCOUNTER — OFFICE VISIT (OUTPATIENT)
Dept: FAMILY MEDICINE | Facility: OTHER | Age: 65
End: 2020-05-26
Attending: FAMILY MEDICINE
Payer: COMMERCIAL

## 2020-05-26 ENCOUNTER — TELEPHONE (OUTPATIENT)
Dept: FAMILY MEDICINE | Facility: OTHER | Age: 65
End: 2020-05-26

## 2020-05-26 VITALS
HEART RATE: 76 BPM | RESPIRATION RATE: 20 BRPM | OXYGEN SATURATION: 98 % | BODY MASS INDEX: 35.34 KG/M2 | WEIGHT: 193.2 LBS | DIASTOLIC BLOOD PRESSURE: 70 MMHG | SYSTOLIC BLOOD PRESSURE: 110 MMHG | TEMPERATURE: 98 F

## 2020-05-26 DIAGNOSIS — L03.211 FACIAL CELLULITIS: Primary | ICD-10-CM

## 2020-05-26 LAB
BACTERIA SPEC CULT: NORMAL
BACTERIA SPEC CULT: NORMAL
BASOPHILS # BLD AUTO: 0.1 10E9/L (ref 0–0.2)
BASOPHILS NFR BLD AUTO: 0.7 %
DIFFERENTIAL METHOD BLD: NORMAL
EOSINOPHIL # BLD AUTO: 0.4 10E9/L (ref 0–0.7)
EOSINOPHIL NFR BLD AUTO: 4.6 %
ERYTHROCYTE [DISTWIDTH] IN BLOOD BY AUTOMATED COUNT: 12.9 % (ref 10–15)
HCT VFR BLD AUTO: 44.2 % (ref 35–47)
HGB BLD-MCNC: 14.5 G/DL (ref 11.7–15.7)
IMM GRANULOCYTES # BLD: 0.1 10E9/L (ref 0–0.4)
IMM GRANULOCYTES NFR BLD: 1.2 %
LYMPHOCYTES # BLD AUTO: 2.2 10E9/L (ref 0.8–5.3)
LYMPHOCYTES NFR BLD AUTO: 24 %
MCH RBC QN AUTO: 29.8 PG (ref 26.5–33)
MCHC RBC AUTO-ENTMCNC: 32.8 G/DL (ref 31.5–36.5)
MCV RBC AUTO: 91 FL (ref 78–100)
MONOCYTES # BLD AUTO: 0.6 10E9/L (ref 0–1.3)
MONOCYTES NFR BLD AUTO: 6.7 %
NEUTROPHILS # BLD AUTO: 5.8 10E9/L (ref 1.6–8.3)
NEUTROPHILS NFR BLD AUTO: 62.8 %
PLATELET # BLD AUTO: 322 10E9/L (ref 150–450)
RBC # BLD AUTO: 4.87 10E12/L (ref 3.8–5.2)
SPECIMEN SOURCE: NORMAL
SPECIMEN SOURCE: NORMAL
WBC # BLD AUTO: 9.2 10E9/L (ref 4–11)

## 2020-05-26 PROCEDURE — 36415 COLL VENOUS BLD VENIPUNCTURE: CPT | Mod: ZL | Performed by: FAMILY MEDICINE

## 2020-05-26 PROCEDURE — G0463 HOSPITAL OUTPT CLINIC VISIT: HCPCS

## 2020-05-26 PROCEDURE — 99495 TRANSJ CARE MGMT MOD F2F 14D: CPT | Performed by: FAMILY MEDICINE

## 2020-05-26 PROCEDURE — 85025 COMPLETE CBC W/AUTO DIFF WBC: CPT | Mod: ZL | Performed by: FAMILY MEDICINE

## 2020-05-26 ASSESSMENT — PAIN SCALES - GENERAL: PAINLEVEL: NO PAIN (0)

## 2020-05-26 NOTE — NURSING NOTE
Patient is here for a hospital follow up, states she was in due to cellulitis on her face.        No LMP recorded (lmp unknown). Patient has had a hysterectomy.  Medication Reconciliation: socorro Hein LPN  5/26/2020 1:02 PM

## 2020-05-26 NOTE — PROGRESS NOTES
SUBJECTIVE:                                                      Patient presents for Hospital Followup Visit:    Pleasant 64-year-old female presents for follow-up of facial cellulitis.  Was admitted for IV antibiotics.  Had sudden onset of right-sided facial redness and swelling.  Unclear etiology.  Responded well to IV antibiotics.  Currently on Ceftin.  Reports dramatic improvement in redness and swelling.  Denies any fever, chills, diarrhea.    Hospital:  [facility]      Date of Admission: 5/20/2020  Date of Discharge: 5/22/2020  Transitional Care Management Phone Call: 5/23/2020  Reason(s) for Admission: facial cellulitis            Problems taking medications regularly:  None       Medication changes since discharge: None       Problems adhering to non-medication therapy:  None    Summary of hospitalization:  [facility] discharge summary reviewed  Diagnostic Tests/Treatments reviewed.  Follow up needed: none  Other Healthcare Providers Involved in Patient s Care:         None  Update since discharge: improved.     ROS:  Constitutional, HEENT, cardiovascular, pulmonary, gi and gu systems are negative, except as otherwise noted.    OBJECTIVE:                                                      GENERAL APPEARANCE: healthy, alert and no distress  HENT: ear canals and TM's normal and nose and mouth without ulcers or lesions  NECK: no adenopathy, no asymmetry, masses, or scars and thyroid normal to palpation  RESP: lungs clear to auscultation - no rales, rhonchi or wheezes  SKIN: no suspicious lesions or rashes    ASSESSMENT/PLAN:                                                      1. Facial cellulitis  Improved with oral antibiotics    - CBC and Differential; Future     Complete Ceftin.    Repeat CBC is reassuring.    Post Discharge Medication Reconciliation: discharge medications reconciled, continue medications without change.  Issues to address: No issues identified.  Plan of  care communicated with patient      Type of Medical Decision Making Face-to-Face Visit within 7 Days Face-to-Face Visit within 14 days   Moderate Complexity [id number]   High Complexity [phone]

## 2021-10-22 NOTE — PLAN OF CARE
"Face remains red and warm with 1+ edema, denies any pain or discomfort at this time, independent in room, declined offer of breakfast would like to discharge right away this morning, updated MD.    /54   Pulse 72   Temp 96.9  F (36.1  C) (Tympanic)   Resp 16   Ht 1.575 m (5' 2\")   Wt 88.3 kg (194 lb 9.6 oz)   SpO2 97%   BMI 35.59 kg/m      Roxana Florentino RN on 5/22/2020 at 8:34 AM    " (0) Answers both questions correctly No

## 2022-05-19 ENCOUNTER — OFFICE VISIT (OUTPATIENT)
Dept: FAMILY MEDICINE | Facility: OTHER | Age: 67
End: 2022-05-19
Attending: FAMILY MEDICINE
Payer: COMMERCIAL

## 2022-05-19 VITALS
TEMPERATURE: 97 F | DIASTOLIC BLOOD PRESSURE: 78 MMHG | SYSTOLIC BLOOD PRESSURE: 134 MMHG | HEIGHT: 62 IN | HEART RATE: 55 BPM | WEIGHT: 186 LBS | BODY MASS INDEX: 34.23 KG/M2 | OXYGEN SATURATION: 97 % | RESPIRATION RATE: 18 BRPM

## 2022-05-19 DIAGNOSIS — Z12.31 ENCOUNTER FOR SCREENING MAMMOGRAM FOR BREAST CANCER: ICD-10-CM

## 2022-05-19 DIAGNOSIS — R42 DIZZINESS: Primary | ICD-10-CM

## 2022-05-19 DIAGNOSIS — E03.9 HYPOTHYROIDISM, UNSPECIFIED TYPE: ICD-10-CM

## 2022-05-19 LAB
ANION GAP SERPL CALCULATED.3IONS-SCNC: 6 MMOL/L (ref 3–14)
BASOPHILS # BLD AUTO: 0.1 10E3/UL (ref 0–0.2)
BASOPHILS NFR BLD AUTO: 1 %
BUN SERPL-MCNC: 10 MG/DL (ref 7–25)
CALCIUM SERPL-MCNC: 9.7 MG/DL (ref 8.6–10.3)
CHLORIDE BLD-SCNC: 103 MMOL/L (ref 98–107)
CO2 SERPL-SCNC: 31 MMOL/L (ref 21–31)
CREAT SERPL-MCNC: 0.73 MG/DL (ref 0.6–1.2)
EOSINOPHIL # BLD AUTO: 0.4 10E3/UL (ref 0–0.7)
EOSINOPHIL NFR BLD AUTO: 7 %
ERYTHROCYTE [DISTWIDTH] IN BLOOD BY AUTOMATED COUNT: 12.5 % (ref 10–15)
GFR SERPL CREATININE-BSD FRML MDRD: 90 ML/MIN/1.73M2
GLUCOSE BLD-MCNC: 92 MG/DL (ref 70–105)
HCT VFR BLD AUTO: 43.8 % (ref 35–47)
HGB BLD-MCNC: 14.4 G/DL (ref 11.7–15.7)
IMM GRANULOCYTES # BLD: 0 10E3/UL
IMM GRANULOCYTES NFR BLD: 0 %
LYMPHOCYTES # BLD AUTO: 1.8 10E3/UL (ref 0.8–5.3)
LYMPHOCYTES NFR BLD AUTO: 30 %
MCH RBC QN AUTO: 29.6 PG (ref 26.5–33)
MCHC RBC AUTO-ENTMCNC: 32.9 G/DL (ref 31.5–36.5)
MCV RBC AUTO: 90 FL (ref 78–100)
MONOCYTES # BLD AUTO: 0.5 10E3/UL (ref 0–1.3)
MONOCYTES NFR BLD AUTO: 8 %
NEUTROPHILS # BLD AUTO: 3.3 10E3/UL (ref 1.6–8.3)
NEUTROPHILS NFR BLD AUTO: 54 %
NRBC # BLD AUTO: 0 10E3/UL
NRBC BLD AUTO-RTO: 0 /100
PLATELET # BLD AUTO: 256 10E3/UL (ref 150–450)
POTASSIUM BLD-SCNC: 4.1 MMOL/L (ref 3.5–5.1)
RBC # BLD AUTO: 4.87 10E6/UL (ref 3.8–5.2)
SODIUM SERPL-SCNC: 140 MMOL/L (ref 134–144)
TSH SERPL DL<=0.005 MIU/L-ACNC: 3.53 MU/L (ref 0.4–4)
WBC # BLD AUTO: 6 10E3/UL (ref 4–11)

## 2022-05-19 PROCEDURE — 85025 COMPLETE CBC W/AUTO DIFF WBC: CPT | Mod: ZL | Performed by: FAMILY MEDICINE

## 2022-05-19 PROCEDURE — 84443 ASSAY THYROID STIM HORMONE: CPT | Mod: ZL | Performed by: FAMILY MEDICINE

## 2022-05-19 PROCEDURE — G0463 HOSPITAL OUTPT CLINIC VISIT: HCPCS

## 2022-05-19 PROCEDURE — 80048 BASIC METABOLIC PNL TOTAL CA: CPT | Mod: ZL | Performed by: FAMILY MEDICINE

## 2022-05-19 PROCEDURE — 99214 OFFICE O/P EST MOD 30 MIN: CPT | Performed by: FAMILY MEDICINE

## 2022-05-19 PROCEDURE — 36415 COLL VENOUS BLD VENIPUNCTURE: CPT | Mod: ZL | Performed by: FAMILY MEDICINE

## 2022-05-19 ASSESSMENT — PAIN SCALES - GENERAL: PAINLEVEL: NO PAIN (0)

## 2022-05-19 NOTE — PROGRESS NOTES
"  Assessment & Plan     Dizziness  Unclear etiology.  Normal neurological exam today.  Labs are reassuring.  May be related to blood pressure changes as she is discontinued her meds.  Recommend resuming lisinopril HCTZ 20/25 1 tab daily.  - Basic Metabolic Panel; Future  - CBC and Differential; Future  - Basic Metabolic Panel  - CBC and Differential    Hypothyroidism, unspecified type  TSH was high normal.  Has been off Synthroid.  Recommend outpatient medication and rechecking TSH in 3 months.  - TSH Reflex GH; Future  - TSH Reflex GH    Encounter for screening mammogram for breast cancer  Due for breast cancer screening.  - MA Screen Bilateral w/Db; Future      There are no Patient Instructions on file for this visit.       BMI:   Estimated body mass index is 34.02 kg/m  as calculated from the following:    Height as of this encounter: 1.575 m (5' 2\").    Weight as of this encounter: 84.4 kg (186 lb).           No follow-ups on file.    Cece Freeman MD  [facility]   Steffany is a 66 year old who presents for the following health issues   Nursing Notes:   Rylie Hein LPN  5/19/2022 11:49 AM  Signed  Patient is here for concerns of vertigo, states will sit up in the morning and the room will spin.     No LMP recorded (lmp unknown). Patient has had a hysterectomy.  Medication Reconciliation: complete    FOOD SECURITY SCREENING QUESTIONS  Hunger Vital Signs:  Within the past 12 months we worried whether our food would run out before we got money to buy more. Never  Within the past 12 months the food we bought just didn't last and we didn't have money to get more. Never  Rylie Hein LPN 5/19/2022 11:24 AM       Advance care directive on file? no  Advance care directive provided to patient? no       Rylie Hein LPN        67 yo female presents with episodes of vertigo for the past few months.  Scribes spinning sensation with certain position changes.  No " "associated tinnitus visual or focal neurological symptoms.    Has not been seen in 2 years.    Episodes have been occurring over the past few years.  Intermittent.  Lasts a few seconds.    She has not been taking her usual medications.  She has been off Synthroid for a year.  Reports her weight is stable.    HPI     Dizziness  Onset/Duration: 2 months ago  Description:   Do you feel faint: no  Does it feel like the surroundings (bed, room) are moving: YES  Unsteady/off balance: YES  Have you passed out or fallen: no  Intensity: mild  Progression of Symptoms: same  Accompanying Signs & Symptoms:  Heart palpitations or chest pain: no  Nausea, vomiting: no  Weakness or lack of coordination in arms or legs: no  Vision or speech changes: no  Numbness or tingling: no  Ringing in ears (Tinnitus): no  Hearing Loss: no  History:   Head trauma/concussion history: no  Previous similar symptoms: no  Recent bleeding history: no  Any new medications (BP?): no  Precipitating factors:   Worse with activity: no  Worse with head movement: YES  Alleviating factors:   Does staying in a fixed position give relief: YES  Therapies tried and outcome: None    Hypertension Follow-up      Do you check your blood pressure regularly outside of the clinic? No     Are you following a low salt diet? No    Are your blood pressures ever more than 140 on the top number (systolic) OR more   than 90 on the bottom number (diastolic), for example 140/90? Yes    Review of Systems   Constitutional, HEENT, cardiovascular, pulmonary, gi and gu systems are negative, except as otherwise noted.      Objective    /78   Pulse 55   Temp 97  F (36.1  C) (Tympanic)   Resp 18   Ht 1.575 m (5' 2\")   Wt 84.4 kg (186 lb)   LMP  (LMP Unknown)   SpO2 97%   Breastfeeding No   BMI 34.02 kg/m    Body mass index is 34.02 kg/m .  Physical Exam  HENT:      Right Ear: Tympanic membrane normal.      Left Ear: Tympanic membrane normal.   Cardiovascular:      Rate " and Rhythm: Normal rate and regular rhythm.      Heart sounds: No murmur heard.  Pulmonary:      Effort: Pulmonary effort is normal. No respiratory distress.      Breath sounds: No wheezing.   Musculoskeletal:      Cervical back: Normal range of motion.   Neurological:      General: No focal deficit present.      Mental Status: She is alert.   Psychiatric:         Mood and Affect: Mood normal.            Results for orders placed or performed in visit on 05/19/22   Basic Metabolic Panel     Status: Normal   Result Value Ref Range    Sodium 140 134 - 144 mmol/L    Potassium 4.1 3.5 - 5.1 mmol/L    Chloride 103 98 - 107 mmol/L    Carbon Dioxide (CO2) 31 21 - 31 mmol/L    Anion Gap 6 3 - 14 mmol/L    Urea Nitrogen 10 7 - 25 mg/dL    Creatinine 0.73 0.60 - 1.20 mg/dL    Calcium 9.7 8.6 - 10.3 mg/dL    Glucose 92 70 - 105 mg/dL    GFR Estimate 90 >60 mL/min/1.73m2   TSH Reflex GH     Status: Normal   Result Value Ref Range    TSH 3.53 0.40 - 4.00 mU/L   CBC with platelets and differential     Status: None   Result Value Ref Range    WBC Count 6.0 4.0 - 11.0 10e3/uL    RBC Count 4.87 3.80 - 5.20 10e6/uL    Hemoglobin 14.4 11.7 - 15.7 g/dL    Hematocrit 43.8 35.0 - 47.0 %    MCV 90 78 - 100 fL    MCH 29.6 26.5 - 33.0 pg    MCHC 32.9 31.5 - 36.5 g/dL    RDW 12.5 10.0 - 15.0 %    Platelet Count 256 150 - 450 10e3/uL    % Neutrophils 54 %    % Lymphocytes 30 %    % Monocytes 8 %    % Eosinophils 7 %    % Basophils 1 %    % Immature Granulocytes 0 %    NRBCs per 100 WBC 0 <1 /100    Absolute Neutrophils 3.3 1.6 - 8.3 10e3/uL    Absolute Lymphocytes 1.8 0.8 - 5.3 10e3/uL    Absolute Monocytes 0.5 0.0 - 1.3 10e3/uL    Absolute Eosinophils 0.4 0.0 - 0.7 10e3/uL    Absolute Basophils 0.1 0.0 - 0.2 10e3/uL    Absolute Immature Granulocytes 0.0 <=0.4 10e3/uL    Absolute NRBCs 0.0 10e3/uL   CBC and Differential     Status: None    Narrative    The following orders were created for panel order CBC and Differential.  Procedure                                Abnormality         Status                     ---------                               -----------         ------                     CBC with platelets and d...[[id number]]                      Final result                 Please view results for these tests on the individual orders.

## 2022-05-19 NOTE — NURSING NOTE
Patient is here for concerns of vertigo, states will sit up in the morning and the room will spin.     No LMP recorded (lmp unknown). Patient has had a hysterectomy.  Medication Reconciliation: complete    FOOD SECURITY SCREENING QUESTIONS  Hunger Vital Signs:  Within the past 12 months we worried whether our food would run out before we got money to buy more. Never  Within the past 12 months the food we bought just didn't last and we didn't have money to get more. Never  Rylie Hein LPN 5/19/2022 11:24 AM       Advance care directive on file? no  Advance care directive provided to patient? no       Rylie Hein LPN

## 2022-05-25 DIAGNOSIS — I10 BENIGN ESSENTIAL HYPERTENSION: ICD-10-CM

## 2022-05-25 RX ORDER — LISINOPRIL AND HYDROCHLOROTHIAZIDE 20; 25 MG/1; MG/1
1 TABLET ORAL DAILY
Qty: 90 TABLET | Refills: 1 | Status: SHIPPED | OUTPATIENT
Start: 2022-05-25 | End: 2023-03-06

## 2022-05-25 NOTE — TELEPHONE ENCOUNTER
Patient states that she does not have her B/P medication. Needs refills. Thank You  Tasha Bernabe LPN ....................  5/25/2022   8:12 AM

## 2022-06-08 ENCOUNTER — HOSPITAL ENCOUNTER (OUTPATIENT)
Dept: MAMMOGRAPHY | Facility: OTHER | Age: 67
Discharge: HOME | End: 2022-06-08
Attending: FAMILY MEDICINE | Admitting: FAMILY MEDICINE
Payer: COMMERCIAL

## 2022-06-08 DIAGNOSIS — Z12.31 ENCOUNTER FOR SCREENING MAMMOGRAM FOR BREAST CANCER: ICD-10-CM

## 2022-06-08 PROCEDURE — 77067 SCR MAMMO BI INCL CAD: CPT

## 2022-09-04 ENCOUNTER — HEALTH MAINTENANCE LETTER (OUTPATIENT)
Age: 67
End: 2022-09-04

## 2022-10-12 ENCOUNTER — OFFICE VISIT (OUTPATIENT)
Dept: FAMILY MEDICINE | Facility: OTHER | Age: 67
End: 2022-10-12
Attending: NURSE PRACTITIONER
Payer: COMMERCIAL

## 2022-10-12 VITALS
DIASTOLIC BLOOD PRESSURE: 70 MMHG | HEART RATE: 78 BPM | SYSTOLIC BLOOD PRESSURE: 128 MMHG | TEMPERATURE: 97.2 F | WEIGHT: 189.5 LBS | OXYGEN SATURATION: 97 % | RESPIRATION RATE: 24 BRPM | BODY MASS INDEX: 34.66 KG/M2

## 2022-10-12 DIAGNOSIS — J20.9 ACUTE BRONCHITIS WITH SYMPTOMS > 10 DAYS: Primary | ICD-10-CM

## 2022-10-12 DIAGNOSIS — J44.1 COPD WITH ACUTE EXACERBATION (H): ICD-10-CM

## 2022-10-12 PROCEDURE — G0463 HOSPITAL OUTPT CLINIC VISIT: HCPCS

## 2022-10-12 PROCEDURE — 99214 OFFICE O/P EST MOD 30 MIN: CPT | Performed by: NURSE PRACTITIONER

## 2022-10-12 RX ORDER — AZITHROMYCIN 250 MG/1
TABLET, FILM COATED ORAL
Qty: 6 TABLET | Refills: 0 | Status: SHIPPED | OUTPATIENT
Start: 2022-10-12 | End: 2022-10-17

## 2022-10-12 NOTE — PROGRESS NOTES
ASSESSMENT/PLAN:    I have reviewed the nursing notes.  I have reviewed the findings, diagnosis, plan and need for follow up with the patient.    1. Acute bronchitis with symptoms > 10 days  2. COPD with acute exacerbation  I discussed with Steffany that her lungs are overall clear and symptoms/exam is consistent with bronchitis, which is most often caused by viral illness and does not require antibiotics. However, given her health history including COPD and auto immune disease along with no signs of improvement over >2-3 weeks, opted to treat with azithromycin today. Oxygen 97%, did not have suspicion of pneumonia and did not feel a chest xr was warranted. Follow up if no improvement or worsening condition.   - azithromycin (ZITHROMAX) 250 MG tablet; Take 2 tablets (500 mg) by mouth daily for 1 day, THEN 1 tablet (250 mg) daily for 4 days.  Dispense: 6 tablet; Refill: 0    Discussed warning signs/symptoms indicative of need to f/u    Follow up if symptoms persist or worsen or concerns    I explained my diagnostic considerations and recommendations to the patient, who voiced understanding and agreement with the treatment plan. All questions were answered. We discussed potential side effects of any prescribed or recommended therapies, as well as expectations for response to treatments.    Shabnam Thorne NP  10/12/2022  9:24 AM    HPI:  Steffany Altamirano is a 67 year old female who presents to [facility] today for concerns of cough x2 + weeks and concern of worsening condition with wheezing that she noted last night. No fevers. Does not feel overall unwell. Does feel fatigued. Cardiology NP thinks bronchitis per Steffany. Hx of pneumonia long time ago; does not get pneumonia often or easily. Does not smoke. Spouse is not ill. Cough is nonproductive. Worse at night. Chest felt tight last night. Has not ever needed steroids or inhalers. Not using OTC cough medicine.  Symptoms seem to be worsening over past 2 weeks without  any improvement. She has not tested for covid.     ROS otherwise negative.     Past Medical History:   Diagnosis Date     Essential (primary) hypertension     Hypertension     Prediabetes     No Comments Provided     Sicca syndrome (H)     Possible Sjogren's     Past Surgical History:   Procedure Laterality Date     ARTHROSCOPY SHOULDER ROTATOR CUFF REPAIR Right 1998    Dr. Sweet      SECTION  1972    1973,Epigastric hernia repair by [facility]     CHOLECYSTECTOMY  1984     COLONOSCOPY  2014    F/U  hyperplastic     HERNIA REPAIR  10/25/2002    Epigastric hernia repair by [facility]     HYSTERECTOMY TOTAL ABDOMINAL, BILATERAL SALPINGO-OOPHORECTOMY, COMBINED  1996    Supracervical hysterectomy and bilateral oophorectomy     LAPAROSCOPIC TUBAL LIGATION            Resection of lipoma of LUQ  [id number]          Social History     Tobacco Use     Smoking status: Former     Types: Cigarettes     Quit date: 1997     Years since quittin.6     Smokeless tobacco: Never   Substance Use Topics     Alcohol use: No     Alcohol/week: 0.0 standard drinks     Current Outpatient Medications   Medication Sig Dispense Refill     levothyroxine (SYNTHROID/LEVOTHROID) 50 MCG tablet Take 1 tablet (50 mcg) by mouth every morning (before breakfast) 90 tablet 3     lisinopril-hydrochlorothiazide (ZESTORETIC) 20-25 MG tablet Take 1 tablet by mouth daily 90 tablet 1     potassium chloride ER (K-DUR/KLOR-CON M) 20 MEQ CR tablet Take 1 tablet (20 mEq) by mouth daily with food 90 tablet 3     rizatriptan (MAXALT-MLT) 5 MG ODT Take 1 tablet (5 mg) by mouth 2 times daily as needed for migraine Give at minimum 2 hrs apart. Max Dose: 30 mg per 24 hrs 30 tablet 3     Allergies   Allergen Reactions     Adhesive Tape Blisters     Paper tape     Latex Blisters     Liquid Adhesive      Other reaction(s): Contact Dermatitis     Past medical history, past surgical history, current medications and allergies reviewed and accurate to the  best of my knowledge.      ROS:  Refer to HPI    LMP  (LMP Unknown)     EXAM:  General Appearance: Well appearing 67 year old female, appropriate appearance for age. No acute distress   Ears: Left TM intact, translucent with bony landmarks appreciated, no erythema, no effusion, no bulging, no purulence.  Right TM intact, translucent with bony landmarks appreciated, no erythema, no effusion, no bulging, no purulence.  Left auditory canal clear.  Right auditory canal clear.  Normal external ears, non tender.  Eyes: conjunctivae normal without erythema or irritation, corneas clear, no drainage or crusting, no eyelid swelling, pupils equal   Oropharynx: moist mucous membranes, posterior pharynx without erythema, tonsils symmetric, no erythema, no exudates or petechiae, no post nasal drip seen, no trismus, voice clear.    Sinuses:  No sinus tenderness upon palpation of the frontal or maxillary sinuses  Nose:  Bilateral nares: no erythema, no edema, no drainage or congestion   Neck: supple without adenopathy  Respiratory: normal chest wall and respirations.  Normal effort.  Clear to auscultation bilaterally, no wheezing, crackles or rhonchi.  No increased work of breathing.  + harsh nonproductive cough appreciated.  Cardiac: RRR with no murmurs  Psychological: normal affect, alert, oriented, and pleasant.

## 2022-10-12 NOTE — PATIENT INSTRUCTIONS
Trial of azithromycin x5 days for worsening condition of suspected bronchitis. Not concerned about pneumonia, lungs sound overall clear today.     Did not feel steroid or inhaler would be helpful as discussed.     Try robitussin DM at night for cough suppression PRN.     Follow up if worsening condition or no improvement.     Note, you may have a mild cough for several weeks following this until it is gone completely. It is reassuring you are not experiencing fevers and your oxygen is 97% today.

## 2022-10-12 NOTE — NURSING NOTE
Pt here for a cough for a couple weeks.  She heard some wheezing last night.  Stephenie Lobo CMA (AAMA)......................10/12/2022  9:23 AM       Medication Reconciliation: complete    Stephenie Lobo CMA  10/12/2022 9:23 AM        FOOD SECURITY SCREENING QUESTIONS:    The next two questions are to help us understand your food security.  If you are feeling you need any assistance in this area, we have resources available to support you today.    Hunger Vital Signs:  Within the past 12 months we worried whether our food would run out before we got money to buy more. Never  Within the past 12 months the food we bought just didn't last and we didn't have money to get more. Never  Stephenie Lobo CMA,LPN on 10/12/2022 at 9:23 AM

## 2023-03-06 ENCOUNTER — OFFICE VISIT (OUTPATIENT)
Dept: FAMILY MEDICINE | Facility: OTHER | Age: 68
End: 2023-03-06
Attending: FAMILY MEDICINE
Payer: COMMERCIAL

## 2023-03-06 VITALS
OXYGEN SATURATION: 97 % | SYSTOLIC BLOOD PRESSURE: 146 MMHG | BODY MASS INDEX: 35.7 KG/M2 | RESPIRATION RATE: 16 BRPM | WEIGHT: 195.2 LBS | TEMPERATURE: 97.2 F | HEART RATE: 64 BPM | DIASTOLIC BLOOD PRESSURE: 68 MMHG

## 2023-03-06 DIAGNOSIS — I10 ESSENTIAL HYPERTENSION: Primary | ICD-10-CM

## 2023-03-06 DIAGNOSIS — E03.9 HYPOTHYROIDISM, UNSPECIFIED TYPE: ICD-10-CM

## 2023-03-06 DIAGNOSIS — E66.01 MORBID OBESITY (H): ICD-10-CM

## 2023-03-06 DIAGNOSIS — R42 DIZZINESS: ICD-10-CM

## 2023-03-06 DIAGNOSIS — R42 VERTIGO: ICD-10-CM

## 2023-03-06 LAB
ALBUMIN SERPL BCG-MCNC: 3.9 G/DL (ref 3.5–5.2)
ALP SERPL-CCNC: 82 U/L (ref 35–104)
ALT SERPL W P-5'-P-CCNC: 13 U/L (ref 10–35)
ANION GAP SERPL CALCULATED.3IONS-SCNC: 6 MMOL/L (ref 7–15)
AST SERPL W P-5'-P-CCNC: 14 U/L (ref 10–35)
BILIRUB SERPL-MCNC: 0.4 MG/DL
BUN SERPL-MCNC: 14.8 MG/DL (ref 8–23)
CALCIUM SERPL-MCNC: 9.5 MG/DL (ref 8.8–10.2)
CHLORIDE SERPL-SCNC: 107 MMOL/L (ref 98–107)
CREAT SERPL-MCNC: 0.96 MG/DL (ref 0.51–0.95)
DEPRECATED HCO3 PLAS-SCNC: 32 MMOL/L (ref 22–29)
GFR SERPL CREATININE-BSD FRML MDRD: 64 ML/MIN/1.73M2
GLUCOSE SERPL-MCNC: 85 MG/DL (ref 70–99)
POTASSIUM SERPL-SCNC: 4.1 MMOL/L (ref 3.4–5.3)
PROT SERPL-MCNC: 6.9 G/DL (ref 6.4–8.3)
SODIUM SERPL-SCNC: 145 MMOL/L (ref 136–145)
TSH SERPL DL<=0.005 MIU/L-ACNC: 2.5 UIU/ML (ref 0.3–4.2)

## 2023-03-06 PROCEDURE — 36415 COLL VENOUS BLD VENIPUNCTURE: CPT | Mod: ZL | Performed by: FAMILY MEDICINE

## 2023-03-06 PROCEDURE — 80053 COMPREHEN METABOLIC PANEL: CPT | Mod: ZL | Performed by: FAMILY MEDICINE

## 2023-03-06 PROCEDURE — G0463 HOSPITAL OUTPT CLINIC VISIT: HCPCS

## 2023-03-06 PROCEDURE — 99214 OFFICE O/P EST MOD 30 MIN: CPT | Performed by: FAMILY MEDICINE

## 2023-03-06 PROCEDURE — 84443 ASSAY THYROID STIM HORMONE: CPT | Mod: ZL | Performed by: FAMILY MEDICINE

## 2023-03-06 RX ORDER — MECLIZINE HYDROCHLORIDE 25 MG/1
25 TABLET ORAL 2 TIMES DAILY PRN
Qty: 6 TABLET | Refills: 0 | Status: SHIPPED | OUTPATIENT
Start: 2023-03-06 | End: 2023-03-09

## 2023-03-06 RX ORDER — LISINOPRIL/HYDROCHLOROTHIAZIDE 10-12.5 MG
1 TABLET ORAL DAILY
Qty: 90 TABLET | Refills: 3 | Status: SHIPPED | OUTPATIENT
Start: 2023-03-06 | End: 2024-02-29

## 2023-03-06 ASSESSMENT — ENCOUNTER SYMPTOMS: DIZZINESS: 1

## 2023-03-06 ASSESSMENT — PAIN SCALES - GENERAL: PAINLEVEL: NO PAIN (0)

## 2023-03-06 NOTE — PROGRESS NOTES
"  Assessment & Plan       ICD-10-CM    1. Essential hypertension  I10 Comprehensive Metabolic Panel     TSH     lisinopril-hydrochlorothiazide (ZESTORETIC) 10-12.5 MG tablet     Comprehensive Metabolic Panel     TSH      2. Dizziness  R42 Comprehensive Metabolic Panel     TSH     Comprehensive Metabolic Panel     TSH      3. Hypothyroidism, unspecified type  E03.9 Comprehensive Metabolic Panel     TSH     Comprehensive Metabolic Panel     TSH      4. Vertigo  R42 meclizine (ANTIVERT) 25 MG tablet     Physical Therapy Referral        1. I have personally reviewed the labs listed below.   2. Prescription for lisinopril/hctz 10/12.5 daily, side effects of this reviewed.  Discussed at home monitoring.    3. Continue same synthroid  4. Prescription for meclizine - reinforced with patient that this is for short term only.  Also referred to physical therapy for this.    Follow up with PCP scheduled.     Review of the result(s) of each unique test - see below  Ordering of each unique test  Prescription drug management         BMI:   Estimated body mass index is 35.7 kg/m  as calculated from the following:    Height as of 5/19/22: 1.575 m (5' 2\").    Weight as of this encounter: 88.5 kg (195 lb 3.2 oz).           Return in about 8 days (around 3/14/2023) for Follow up.    STEVIE PACE MD  [facility]    Subjective   anand altamirano is a 67 year old, presenting for the following health issues:  Hypertension and Dizziness    Anand Altamirano is a 67 year old female in for evaluation of elevation.  She has a home BP cuff.  Started checking her BP at home 4-5 days ago due to dizziness.   No chest pain.  No nausea.   Denies history of hypertension, but had been on Zestoretic.    She was also on synthroid in the past  - not currently taking.  TSH   Date Value Ref Range Status   05/19/2022 3.53 0.40 - 4.00 mU/L Final      +FH of hypertension  BP at work this morning was 182/88.    At home on the " weekend was 170s       Dizziness    History of Present Illness       Reason for visit:  Blood preasher  Symptom onset:  3-7 days ago  Symptom intensity:  Moderate  Symptom progression:  Staying the same  Had these symptoms before:  Yes  Has tried/received treatment for these symptoms:  No    She eats 0-1 servings of fruits and vegetables daily.She exercises with enough effort to increase her heart rate 60 or more minutes per day.  She exercises with enough effort to increase her heart rate 6 days per week.   She is taking medications regularly.       Hypertension Follow-up      Do you check your blood pressure regularly outside of the clinic? Yes      Are you following a low salt diet? Yes    Are your blood pressures ever more than 140 on the top number (systolic) OR more   than 90 on the bottom number (diastolic), for example 140/90? Yes    Past Medical History:   Diagnosis Date     Essential (primary) hypertension     Hypertension     Prediabetes     No Comments Provided     Sicca syndrome (H)     Possible Sjogren's     Current Outpatient Medications   Medication     lisinopril-hydrochlorothiazide (ZESTORETIC) 10-12.5 MG tablet     meclizine (ANTIVERT) 25 MG tablet     No current facility-administered medications for this visit.        Review of Systems   Neurological: Positive for dizziness.            Objective    BP (!) 154/74   Pulse 64   Temp 97.2  F (36.2  C) (Tympanic)   Resp 16   Wt 88.5 kg (195 lb 3.2 oz)   LMP  (LMP Unknown)   SpO2 97%   Breastfeeding No   BMI 35.70 kg/m    Body mass index is 35.7 kg/m .  Physical Exam  Vitals and nursing note reviewed.   Constitutional:       Appearance: Normal appearance. She is obese.   HENT:      Right Ear: Tympanic membrane normal.      Left Ear: Tympanic membrane normal.   Eyes:      Comments: Vertigo with lateral gaze     Cardiovascular:      Rate and Rhythm: Normal rate and regular rhythm.   Pulmonary:      Effort: Pulmonary effort is normal.    Neurological:      Mental Status: She is alert.            Results for orders placed or performed in visit on 03/06/23   Comprehensive Metabolic Panel     Status: Abnormal   Result Value Ref Range    Sodium 145 136 - 145 mmol/L    Potassium 4.1 3.4 - 5.3 mmol/L    Chloride 107 98 - 107 mmol/L    Carbon Dioxide (CO2) 32 (H) 22 - 29 mmol/L    Anion Gap 6 (L) 7 - 15 mmol/L    Urea Nitrogen 14.8 8.0 - 23.0 mg/dL    Creatinine 0.96 (H) 0.51 - 0.95 mg/dL    Calcium 9.5 8.8 - 10.2 mg/dL    Glucose 85 70 - 99 mg/dL    Alkaline Phosphatase 82 35 - 104 U/L    AST 14 10 - 35 U/L    ALT 13 10 - 35 U/L    Protein Total 6.9 6.4 - 8.3 g/dL    Albumin 3.9 3.5 - 5.2 g/dL    Bilirubin Total 0.4 <=1.2 mg/dL    GFR Estimate 64 >60 mL/min/1.73m2   TSH     Status: Normal   Result Value Ref Range    TSH 2.50 0.30 - 4.20 uIU/mL

## 2023-03-06 NOTE — PATIENT INSTRUCTIONS
Labs today - looking to see if you should restart thyroid medication  Blood pressure medication:  lisinopril/hydrochlorothiazide once a day  Continue to check your blood pressure as you have been.  Meclizine - short term for dizziness  Physical therapy  - for the dizziness and vertigo

## 2023-05-15 ENCOUNTER — OFFICE VISIT (OUTPATIENT)
Dept: FAMILY MEDICINE | Facility: OTHER | Age: 68
End: 2023-05-15
Attending: FAMILY MEDICINE
Payer: COMMERCIAL

## 2023-05-15 VITALS
DIASTOLIC BLOOD PRESSURE: 82 MMHG | WEIGHT: 194.4 LBS | SYSTOLIC BLOOD PRESSURE: 136 MMHG | HEART RATE: 60 BPM | HEIGHT: 62 IN | TEMPERATURE: 99.4 F | OXYGEN SATURATION: 97 % | RESPIRATION RATE: 16 BRPM | BODY MASS INDEX: 35.77 KG/M2

## 2023-05-15 DIAGNOSIS — J30.1 SEASONAL ALLERGIC RHINITIS DUE TO POLLEN: Primary | ICD-10-CM

## 2023-05-15 DIAGNOSIS — R06.2 WHEEZING: ICD-10-CM

## 2023-05-15 DIAGNOSIS — Z87.891 HISTORY OF NICOTINE USE: ICD-10-CM

## 2023-05-15 PROCEDURE — 99213 OFFICE O/P EST LOW 20 MIN: CPT | Performed by: FAMILY MEDICINE

## 2023-05-15 PROCEDURE — G0463 HOSPITAL OUTPT CLINIC VISIT: HCPCS

## 2023-05-15 RX ORDER — ALBUTEROL SULFATE 90 UG/1
2 AEROSOL, METERED RESPIRATORY (INHALATION) EVERY 4 HOURS PRN
Qty: 18 G | Refills: 11 | Status: SHIPPED | OUTPATIENT
Start: 2023-05-15 | End: 2024-06-06

## 2023-05-15 RX ORDER — FLUTICASONE PROPIONATE 50 MCG
1 SPRAY, SUSPENSION (ML) NASAL DAILY
Qty: 16 G | Refills: 11 | Status: SHIPPED | OUTPATIENT
Start: 2023-05-15 | End: 2023-11-29

## 2023-05-15 RX ORDER — LORATADINE 10 MG/1
10 TABLET ORAL DAILY PRN
Qty: 90 TABLET | Refills: 3 | Status: SHIPPED | OUTPATIENT
Start: 2023-05-15 | End: 2024-02-29

## 2023-05-15 ASSESSMENT — ENCOUNTER SYMPTOMS
SORE THROAT: 1
WHEEZING: 1

## 2023-05-15 ASSESSMENT — PAIN SCALES - GENERAL: PAINLEVEL: NO PAIN (0)

## 2023-05-15 NOTE — NURSING NOTE
"Chief Complaint   Patient presents with     Nasal Congestion     Pharyngitis     Wheezing     Patient is here for congestion, wheezing, and runny nose     Initial /82   Pulse 60   Temp 99.4  F (37.4  C) (Tympanic)   Resp 16   Ht 1.562 m (5' 1.5\")   Wt 88.2 kg (194 lb 6.4 oz)   LMP  (LMP Unknown)   SpO2 97%   Breastfeeding No   BMI 36.14 kg/m   Estimated body mass index is 36.14 kg/m  as calculated from the following:    Height as of this encounter: 1.562 m (5' 1.5\").    Weight as of this encounter: 88.2 kg (194 lb 6.4 oz).  Medication Reconciliation: complete    Shabnam Guerra CMA       FOOD SECURITY SCREENING QUESTIONS:    The next two questions are to help us understand your food security.  If you are feeling you need any assistance in this area, we have resources available to support you today.    Hunger Vital Signs:  Within the past 12 months we worried whether our food would run out before we got money to buy more. Never  Within the past 12 months the food we bought just didn't last and we didn't have money to get more. Never  Shabnam Guerra CMA,LPN on 5/15/2023 at 3:15 PM      "

## 2023-05-15 NOTE — PROGRESS NOTES
"  Assessment & Plan       ICD-10-CM    1. Seasonal allergic rhinitis due to pollen  J30.1 fluticasone (FLONASE) 50 MCG/ACT nasal spray     loratadine (CLARITIN) 10 MG tablet     albuterol (PROAIR HFA/PROVENTIL HFA/VENTOLIN HFA) 108 (90 Base) MCG/ACT inhaler      2. Wheezing  R06.2 albuterol (PROAIR HFA/PROVENTIL HFA/VENTOLIN HFA) 108 (90 Base) MCG/ACT inhaler     Pulmonary Function Test ()      3. History of nicotine use  Z87.891         1.  Differential diagnosis of symptoms includes allergies, infection, other irritant.  Given the difference in her symptoms at work versus home, consider allergy induced.  We will have her start on Flonase 1 spray each nostril daily scheduled and as needed loratadine and as needed albuterol.  If symptoms or not improving, follow-up.  2.  In discussing concerns related to wheezing and emphysema, I was able to find in her chart that she had pulmonary function testing done in 2016 which was normal.  There is some concern related to imaging of emphysema, also related family history and past history of smoking.  She is interested in proceeding with pulmonary function testing that is ordered today as well.  Follow-up will be pending those results.    Ordering of each unique test  Prescription drug management         BMI:   Estimated body mass index is 36.14 kg/m  as calculated from the following:    Height as of this encounter: 1.562 m (5' 1.5\").    Weight as of this encounter: 88.2 kg (194 lb 6.4 oz).     Return if symptoms worsen or fail to improve.    STEVIE PACE MD  [facility]    Subjective   Steffany Altamirano is a 67 year old, presenting for the following health issues:  Nasal Congestion, Pharyngitis, and Wheezing    Started about 3 weeks ago when [employer] got their flowers in.  Symptoms at work are cough, wheezing, congestion, sneezing.  Some eye irritation.    No fever. Mild sore throat.    Hasn't taken anything for it.  Symptoms are better when " "at home.      Patient also asked today for emphysema is hereditary.  She states that her parent and grandparent had this.  She does not recall previously having pulmonary function testing done.  She did used to smoke.  She states that she started to have more coughing and wheezing, a coworker told her that she might have emphysema.        5/15/2023     3:12 PM   Additional Questions   Roomed by CLAUDE Haque   Accompanied by Self     Pharyngitis     Wheezing  Associated symptoms include a sore throat.        Acute Illness  Acute illness concerns: Sore throat, congestion, and runny nose   Onset/Duration: 2-3 weeks ago   Symptoms:  Fever: No  Chills/Sweats: No  Headache (location?): No  Sinus Pressure: YES  Conjunctivitis:  No  Ear Pain: no  Rhinorrhea: YES  Congestion: YES  Sore Throat: YES  Cough: no  Wheeze: YES  Decreased Appetite: No  Nausea: No  Vomiting: No  Diarrhea: No  Dysuria/Freq.: No  Dysuria or Hematuria: No  Fatigue/Achiness: No  Sick/Strep Exposure: No  Therapies tried and outcome: None      Current Outpatient Medications   Medication     albuterol (PROAIR HFA/PROVENTIL HFA/VENTOLIN HFA) 108 (90 Base) MCG/ACT inhaler     fluticasone (FLONASE) 50 MCG/ACT nasal spray     lisinopril-hydrochlorothiazide (ZESTORETIC) 10-12.5 MG tablet     loratadine (CLARITIN) 10 MG tablet     No current facility-administered medications for this visit.     Past Medical History:   Diagnosis Date     Essential (primary) hypertension     Hypertension     Prediabetes     No Comments Provided     Sicca syndrome (H)     Possible Sjogren's           Review of Systems   HENT: Positive for sore throat.    Respiratory: Positive for wheezing.             Objective    /82   Pulse 60   Temp 99.4  F (37.4  C) (Tympanic)   Resp 16   Ht 1.562 m (5' 1.5\")   Wt 88.2 kg (194 lb 6.4 oz)   LMP  (LMP Unknown)   SpO2 97%   Breastfeeding No   BMI 36.14 kg/m    Body mass index is 36.14 kg/m .  Physical Exam  Vitals and nursing note " reviewed.   Constitutional:       Appearance: Normal appearance. She is obese.   HENT:      Right Ear: Tympanic membrane normal.      Left Ear: Tympanic membrane normal.      Nose: Rhinorrhea present.      Mouth/Throat:      Pharynx: No posterior oropharyngeal erythema.   Cardiovascular:      Rate and Rhythm: Normal rate and regular rhythm.   Pulmonary:      Breath sounds: Wheezing present.   Neurological:      Mental Status: She is alert.

## 2023-05-15 NOTE — PATIENT INSTRUCTIONS
Nasal sprays - eyes, nose, throat   Oral allergy medication  - as needed  Inhaler to help with wheezing   - as needed

## 2023-05-26 ENCOUNTER — HOSPITAL ENCOUNTER (OUTPATIENT)
Dept: RESPIRATORY THERAPY | Facility: OTHER | Age: 68
Discharge: HOME | End: 2023-05-26
Attending: FAMILY MEDICINE | Admitting: FAMILY MEDICINE
Payer: COMMERCIAL

## 2023-05-26 DIAGNOSIS — R06.2 WHEEZING: ICD-10-CM

## 2023-05-26 PROCEDURE — 94010 BREATHING CAPACITY TEST: CPT

## 2023-05-26 PROCEDURE — 94729 DIFFUSING CAPACITY: CPT

## 2023-05-26 PROCEDURE — 94726 PLETHYSMOGRAPHY LUNG VOLUMES: CPT

## 2023-05-26 PROCEDURE — 94010 BREATHING CAPACITY TEST: CPT | Mod: 26 | Performed by: INTERNAL MEDICINE

## 2023-05-26 PROCEDURE — 999N000157 HC STATISTIC RCP TIME EA 10 MIN

## 2023-05-26 PROCEDURE — 94729 DIFFUSING CAPACITY: CPT | Mod: 26 | Performed by: INTERNAL MEDICINE

## 2023-05-26 PROCEDURE — 94726 PLETHYSMOGRAPHY LUNG VOLUMES: CPT | Mod: 26 | Performed by: INTERNAL MEDICINE

## 2023-09-11 ENCOUNTER — PATIENT OUTREACH (OUTPATIENT)
Dept: FAMILY MEDICINE | Facility: OTHER | Age: 68
End: 2023-09-11
Payer: COMMERCIAL

## 2023-09-11 NOTE — LETTER
[facility]  [address]  [facility] [address]  [phone]       September 11, 2023    Steffany Altamirano  [address]  [place] [facility] [address]    Dear Steffany Altamirano,    We care about your health and have reviewed your health plan and are making recommendations based on this review, to optimize your health.    You are in particular need of attention regarding:  -Wellness (Physical) Visit     We are recommending that you:  -schedule a WELLNESS (Physical) APPOINTMENT with me.        In addition, here is a list of due or overdue Health Maintenance reminders.    Health Maintenance Due   Topic Date Due    Discuss Advance Care Planning  Never done    COPD Action Plan  Never done    Pneumococcal Vaccine (1 - PCV) Never done    Hepatitis C Screening  Never done    Zoster (Shingles) Vaccine (1 of 2) Never done    Annual Wellness Visit  Never done    COVID-19 Vaccine (3 - Moderna series) 07/29/2021    Flu Vaccine (1) 09/01/2023       To address the above recommendations, we encourage you to contact us at [phone]. They will assist you with finding the most convenient time and location.    Thank you for trusting [facility] and we appreciate the opportunity to serve you.  We look forward to supporting your healthcare needs in the future.    Healthy Regards,    Your [facility] Team

## 2023-09-11 NOTE — TELEPHONE ENCOUNTER
Patient Quality Outreach    Patient is due for the following:   Physical Annual Wellness Visit    Next Steps:   Schedule a Annual Wellness Visit    Type of outreach:    Sent letter.      Questions for provider review:    None           Samia Silva

## 2023-11-29 ENCOUNTER — OFFICE VISIT (OUTPATIENT)
Dept: FAMILY MEDICINE | Facility: OTHER | Age: 68
End: 2023-11-29
Attending: STUDENT IN AN ORGANIZED HEALTH CARE EDUCATION/TRAINING PROGRAM
Payer: COMMERCIAL

## 2023-11-29 VITALS
OXYGEN SATURATION: 96 % | HEIGHT: 62 IN | DIASTOLIC BLOOD PRESSURE: 76 MMHG | RESPIRATION RATE: 18 BRPM | HEART RATE: 55 BPM | SYSTOLIC BLOOD PRESSURE: 130 MMHG | WEIGHT: 187.25 LBS | BODY MASS INDEX: 34.46 KG/M2 | TEMPERATURE: 97.5 F

## 2023-11-29 DIAGNOSIS — M35.00 SJOGREN'S SYNDROME, WITH UNSPECIFIED ORGAN INVOLVEMENT (H): ICD-10-CM

## 2023-11-29 DIAGNOSIS — H81.10 BENIGN PAROXYSMAL POSITIONAL VERTIGO, UNSPECIFIED LATERALITY: ICD-10-CM

## 2023-11-29 DIAGNOSIS — H69.93 DYSFUNCTION OF BOTH EUSTACHIAN TUBES: Primary | ICD-10-CM

## 2023-11-29 PROBLEM — J44.1 COPD WITH ACUTE EXACERBATION (H): Status: ACTIVE | Noted: 2023-11-29

## 2023-11-29 PROBLEM — J44.1 COPD WITH ACUTE EXACERBATION (H): Status: RESOLVED | Noted: 2023-11-29 | Resolved: 2023-11-29

## 2023-11-29 PROCEDURE — 99213 OFFICE O/P EST LOW 20 MIN: CPT | Performed by: STUDENT IN AN ORGANIZED HEALTH CARE EDUCATION/TRAINING PROGRAM

## 2023-11-29 PROCEDURE — G0463 HOSPITAL OUTPT CLINIC VISIT: HCPCS

## 2023-11-29 RX ORDER — FLUTICASONE PROPIONATE 50 MCG
1 SPRAY, SUSPENSION (ML) NASAL DAILY
Qty: 11 ML | Refills: 1 | Status: SHIPPED | OUTPATIENT
Start: 2023-11-29 | End: 2024-06-06

## 2023-11-29 RX ORDER — MECLIZINE HYDROCHLORIDE 25 MG/1
25 TABLET ORAL 3 TIMES DAILY PRN
Qty: 60 TABLET | Refills: 3 | Status: SHIPPED | OUTPATIENT
Start: 2023-11-29

## 2023-11-29 ASSESSMENT — PATIENT HEALTH QUESTIONNAIRE - PHQ9
SUM OF ALL RESPONSES TO PHQ QUESTIONS 1-9: 3
10. IF YOU CHECKED OFF ANY PROBLEMS, HOW DIFFICULT HAVE THESE PROBLEMS MADE IT FOR YOU TO DO YOUR WORK, TAKE CARE OF THINGS AT HOME, OR GET ALONG WITH OTHER PEOPLE: NOT DIFFICULT AT ALL
SUM OF ALL RESPONSES TO PHQ QUESTIONS 1-9: 3

## 2023-11-29 ASSESSMENT — PAIN SCALES - GENERAL: PAINLEVEL: NO PAIN (0)

## 2023-11-29 NOTE — COMMUNITY RESOURCES LIST (ENGLISH)
11/29/2023   [facility]  N/A  For questions about this resource list or additional care needs, please contact your primary care clinic or care manager.  Phone: [phone]   Email: N/A   Address: [address] [place] [address]   Hours: N/A        Food and Nutrition       Food pantry  1  [facility] Distance: 6.12 miles      In-Person   [address] [place] [address]  Language: English  Hours: Mon - Thu 11:00 AM - 3:30 PM  Fees: Free   Phone: (721) 203-7973 Email: info@[employer] Website: https://[employer]     2  [facility] Food Shelf Distance: 17.92 miles      Pick   [address] [place] [address]  Language: English  Hours: Thu 10:00 AM - 1:00 PM  Fees: Free   Phone: (540) 162-4746 Email: felecia@[employer] Website: https://www.imbookin ([place]).Lumetrics/[place]FoodShelf/     SNAP application assistance  3  [facility] Distance: 7.4 miles      [address] [place] [address]  Language: English  Hours: Mon - Fri 8:00 AM - 4:30 PM  Fees: Free   Phone: (874) 763-6429 Website: https://www.[facility]./[facility]/Health-Human-Services     4  [facility] Distance: 7.4 miles      In-Person, Phone/Virtual   [address] [place] [address]  Language: English  Hours: Mon 8:00 AM - 4:30 PM Appt. Only, Tue - Thu 8:00 AM - 4:30 PM , Fri 8:00 AM - 4:30 PM Appt. Only  Fees: Free   Phone: (987) 888-4707 Website: http://www.[facility]     Soup kitchen or free meals  5  [facility] Distance: 23.95 miles      Pickup   [address] [address]  Language: English  Hours: Mon - Fri 4:00 PM - 4:45 PM  Fees: Free   Phone: (273) 845-2496 Email: tatiana@[facility].org Website: https://central[facility].org/northern/[place]          [facility] for  families  6  [facility] Distance: 7.18 miles      In-Person   [address] [place] [address]  Language: English  Hours: Mon - Sun Open 24 Hours  Fees: Free   Phone: (585) 675-7358 Email: info@[employer] Website: https://www.[employer]ola/     Shelter for individuals  7  [facility] Distance: 7.18 miles      In-Person   [address] [place] [address]  Language: English  Hours: Mon - Sun Open 24 Hours  Fees: Free   Phone: (955) 756-8585 Email: info@[employer] Website: https://www.[employer]/          Important Numbers & Websites       Emergency Services   911  [facility] Services   311  Poison Control   (175) 175-2533  Suicide Prevention Lifeline   (672) 941-5718 (TALK)  Child Abuse Hotline   (323) 107-7498 (4-A-Child)  Sexual Assault Hotline   (259) 851-2500 (HOPE)  National Runaway Safeline   (697) 668-1821 (RUNAWAY)  All-Options Talkline   (410) 814-4715  Substance Abuse Referral   (830) 172-4586 (HELP)

## 2023-11-29 NOTE — PROGRESS NOTES
"  Assessment & Plan     Dysfunction of both eustachian tubes  Clear effusions bilaterally. Trial flonase. Notes recent allergies   - fluticasone (FLONASE) 50 MCG/ACT nasal spray; Spray 1 spray into both nostrils daily    Benign paroxysmal positional vertigo, unspecified laterality  Exam and history likely BPPV. Referral to PT, trial meclizine as she has tolerated in the past   - meclizine (ANTIVERT) 25 MG tablet; Take 1 tablet (25 mg) by mouth 3 times daily as needed for dizziness  - Physical Therapy Referral; Future    Sjogren's syndrome, with unspecified organ involvement (H24)  Stable, noted in chart as possible. No current symptoms              BMI:   Estimated body mass index is 34.81 kg/m  as calculated from the following:    Height as of this encounter: 1.562 m (5' 1.5\").    Weight as of this encounter: 84.9 kg (187 lb 4 oz).           No follow-ups on file.    Keven Bhagat MD  [facility]    Subjective   Steffany Altamirano is a 68 year old, presenting for the following health issues:  Dizziness (Room spinning, bilateral leg weakness, right ear plugged)      History of Present Illness       Reason for visit:  Not feelig well    She eats 0-1 servings of fruits and vegetables daily.She consumes 0 sweetened beverage(s) daily.She exercises with enough effort to increase her heart rate 20 to 29 minutes per day.  She exercises with enough effort to increase her heart rate 7 days per week. She is missing 2 dose(s) of medications per week.     Dizzy spells  - started 3 weeks ago   - whole room spins  - every day, no known triggers  - happens when standing up or laying down or turning head to the side   - no ear pain or plug sensation  - legs feel weak when this happens  - resting, goes away and then comes back   - does have allergies               Review of Systems   Constitutional, HEENT, cardiovascular, pulmonary, gi and gu systems are negative, except as otherwise noted.      Objective  " "  /76 (BP Location: Right arm, Patient Position: Sitting, Cuff Size: Adult Large)   Pulse 55   Temp 97.5  F (36.4  C) (Tympanic)   Resp 18   Ht 1.562 m (5' 1.5\")   Wt 84.9 kg (187 lb 4 oz)   LMP  (LMP Unknown)   SpO2 96%   BMI 34.81 kg/m    Body mass index is 34.81 kg/m .  Physical Exam   GENERAL: healthy, alert and no distress  EYES: Eyes grossly normal to inspection, PERRL and conjunctivae and sclerae normal  HENT: normal cephalic/atraumatic and both ears: clear effusion  RESP: lungs clear to auscultation - no rales, rhonchi or wheezes  CV: regular rate and rhythm, normal S1 S2, no S3 or S4, no murmur, click or rub, no peripheral edema and peripheral pulses strong  NEURO: Normal strength and tone, mentation intact. Nystagmus for 4 beats when laying on right side. All other cranial nerves grossly intact  PSYCH: mentation appears normal, affect normal/bright                      "

## 2023-11-29 NOTE — NURSING NOTE
"Medication Reconciliation: Complete    Gladys Fletcher LPN  11/29/2023 8:10 AM  Chief Complaint   Patient presents with    Dizziness     Room spinning, bilateral leg weakness, right ear plugged       Initial /76 (BP Location: Right arm, Patient Position: Sitting, Cuff Size: Adult Large)   Pulse 55   Temp 97.5  F (36.4  C) (Tympanic)   Resp 18   Ht 1.562 m (5' 1.5\")   Wt 84.9 kg (187 lb 4 oz)   LMP  (LMP Unknown)   SpO2 96%   BMI 34.81 kg/m   Estimated body mass index is 34.81 kg/m  as calculated from the following:    Height as of this encounter: 1.562 m (5' 1.5\").    Weight as of this encounter: 84.9 kg (187 lb 4 oz).  Medication Review: complete    The next two questions are to help us understand your food security.  If you are feeling you need any assistance in this area, we have resources available to support you today.          11/29/2023   SDOH- Food Insecurity   Within the past 12 months, did you worry that your food would run out before you got money to buy more? Y   Within the past 12 months, did the food you bought just not last and you didn t have money to get more? Y         Health Care Directive:  Patient does not have a Health Care Directive or Living Will: Discussed advance care planning with patient; however, patient declined at this time.    Gladys Fletcher LPN      "

## 2023-11-29 NOTE — COMMUNITY RESOURCES LIST (ENGLISH)
11/29/2023   [facility]  N/A  For additional resource needs, please contact your health insurance member services or your primary care team.  Phone: [phone]   Email: N/A   Address: [facility] [place] [address]   Hours: N/A        Food and Nutrition       Food pantry  1  [facility] Distance: 6.12 miles      In-Person   [address]  [place] [address]  Language: English  Hours: Mon - Thu 11:00 AM - 3:30 PM  Fees: Free   Phone: (196) 198-6206 Email: info@[employer] Website: https://[employer]     2  [facility] Food Shelf Distance: 17.92 miles      Pickup   [address] [place] [address]  Language: English  Hours: Thu 10:00 AM - 1:00 PM  Fees: Free   Phone: (224) 296-6622 Email: felecia@[employer] Website: https://www.[employer]book.menuvox/[place]AreaFoodShelf/     SNAP application assistance  3  [facility] Distance: 7.4 miles      [address] [place] [address]  Language: English  Hours: Mon - Fri 8:00 AM - 4:30 PM  Fees: Free   Phone: (944) 117-6250 Website: https://www.[facility]./[facility]/Health-Human-Services     4  [facility] Distance: 7.4 miles      In-Person, Phone/Virtual   [address] [place] [address]  Language: English  Hours: Mon 8:00 AM - 4:30 PM Appt. Only, Tue - Thu 8:00 AM - 4:30 PM , Fri 8:00 AM - 4:30 PM Appt. Only  Fees: Free   Phone: (710) 898-6905 Website: http://www.[employer]     Soup kitchen or free meals  5  [facility] [place] Distance: 23.95 miles      Pickup   [address] [address]  Language: English  Hours: Mon - Fri 4:00 PM - 4:45 PM  Fees: Free   Phone: (413) 737-4791 Email: tatiana@[facility].org Website: https://centralCH[facility]US St[facility].org/northern/[place]          Housing       Shelter for families  6   [facility] Distance: 7.18 miles      In-Person   [address] [place] [address]  Language: English  Hours: Mon - Sun Open 24 Hours  Fees: Free   Phone: (107) 558-7783 Email: info@[employer] Website: https://www.[employer]/     Shelter for individuals  7  [facility] Distance: 7.18 miles      In-Person   [address] [place] [address]  Language: English  Hours: Mon - Sun Open 24 Hours  Fees: Free   Phone: (176) 172-4902 Email: info@[employer] Website: https://www.[employer]/          Important Numbers & Websites       [address].org  Poison Control   (848) 530-4862 [place]po[place].org  Suicide and Crisis Lifeline   [address] [place], [place] [address]line.org  Childhelp [place] Child Abuse Hotline   297.897.7092 Childhelphotline.org  [place] Sexual Assault Hotline   (664) 856-5596 (HOPE) [facility] Runaway Safeline   (318) 102-1384 (RUNAWAY) [address] [place] [address].org  Pregnancy & Postpartum Support [place]   Call/text 465-617-9832 Ppsupportmn.org  Substance Abuse National Helpline ([facility]   535-118-HELP (8763) Findtreatment.gov  Emergency Services   911

## 2023-12-06 ENCOUNTER — THERAPY VISIT (OUTPATIENT)
Dept: PHYSICAL THERAPY | Facility: OTHER | Age: 68
End: 2023-12-06
Attending: STUDENT IN AN ORGANIZED HEALTH CARE EDUCATION/TRAINING PROGRAM
Payer: COMMERCIAL

## 2023-12-06 DIAGNOSIS — H81.10 BENIGN PAROXYSMAL POSITIONAL VERTIGO, UNSPECIFIED LATERALITY: ICD-10-CM

## 2023-12-06 DIAGNOSIS — R42 VERTIGO: Primary | ICD-10-CM

## 2023-12-06 PROCEDURE — 95992 CANALITH REPOSITIONING PROC: CPT | Mod: GP,PN,GZ | Performed by: PHYSICAL THERAPIST

## 2023-12-06 PROCEDURE — 97110 THERAPEUTIC EXERCISES: CPT | Mod: GP,PN,59 | Performed by: PHYSICAL THERAPIST

## 2023-12-06 PROCEDURE — 97163 PT EVAL HIGH COMPLEX 45 MIN: CPT | Mod: GP,PN | Performed by: PHYSICAL THERAPIST

## 2023-12-06 NOTE — PROGRESS NOTES
"PHYSICAL THERAPY EVALUATION  Type of Visit: Evaluation    See electronic medical record for Abuse and Falls Screening details.    Subjective       Presenting condition or subjective complaint:  Dizziness - started about one month ago.  States dizziness is \"just there\", doesn't have to be doing anything and all of a sudden the room spins.  Lasts a couple minutes.  Has to just go away on it's own.  Sits still.    Did have one prior episode of dizziness (years ago).  Gradually went away on it's own, no treatment needed at that time.  Has not had any other treatment for current episode of dizziness.  Does state however that she has been taking meclizine 3x/day since 11/30/23.  Doesn't feel meds are helping \"I still get dizzy\".  Went to lay down last night and the whole room started to spin.  Denies spinning with rolling.  Does get dizzy with return to sitting.      Pertinent visual history: has black spots in visual field (chronic), has not changed  Pertinent history of current vestibular problem: Migraines  Date of onset: 11/01/23      Prior Level of Function  Transfers: Independent  Ambulation: Independent  ADL: Independent    Living Environment  Social support:   Lives with her son, daughter, and son in law  Type of home:   house  Stairs to enter the home:       3 steps to enter  Employment:     works full time at [employer] as a ; Has been able to continue working.    Patient goals for therapy:  eliminate dizziness    Pain assessment: no pain     Objective      RANGE OF MOTION: full neck ROM screened prior to oculomotor exam  STRENGTH: WFL for transfers and walking  TRANSFERS: Independent  GAIT:   Level of Darke: Independent  Assistive Device(s): None  Gait Deviations: widened JULISSA, limited step length, hesitancy with transfer sit-sand and turns  BALANCE: impairment observed, indicated by gait deviations      Oculomotor Screen    Ocular ROM Normal   Smooth Pursuit Normal   Saccades Normal   VOR Abnormal - " able to track but feels woosy after   VOR Cancellation Normal   Head Impulse Test Normal   Convergence Testing Abnormal - 36cm/14 inches        Infrared Goggle Exam Vestibular Suppressant in Last 24 Hours? Yes - last night, but none this morning  Exam Completed With:  frenzel lenses   Spontaneous Nystagmus Negative   Gaze Evoked Nystagmus Negative    Left Right   [place] Downbeating, Upbeating L torsional - onset approx 14 sec, stopped around 18 seconds, downbeats started and did not fatigue; symptomatic only with short duration up-beat with torsion; Downbeating - immediate low amplitude downbeats, non-fatiguing - Pt denies symptoms.   [facility] Supine Roll Test Negative Negative          Assessment & Plan   CLINICAL IMPRESSIONS  Medical Diagnosis: BPPV, vertigo    Treatment Diagnosis: vestibular dysfunction   Impression/Assessment: Patient is a 68 year old female with complaints of dizziness.  The following significant findings have been identified: Impaired balance, Impaired gait, Instability, and Dizziness. These impairments interfere with their ability to perform self care tasks, household chores, household mobility, and community mobility as compared to previous level of function.     Based on positional testing, patient demonstrates signs of a central dysfunction.  She may also have a left posterior canal canalithiasis.  CRM was performed and signs of canalithiasis were resolved with retest.  Continued signs of central dysfunction with no patient reports of symptoms.  Instructed ongoing symptom monitoring and appropriate follow up. Instructed results of evaluation, treatment, and therapy plan of care.       Clinical Decision Making (Complexity):  Clinical Presentation: Unstable/Unpredictable   Clinical Presentation Rationale: based on medical and personal factors listed in PT evaluation  Clinical Decision Making (Complexity): High complexity    PLAN OF CARE  Treatment Interventions:  Interventions: Gait  Training, Neuromuscular Re-education, Therapeutic Exercise, Canalith Repositioning    Long Term Goals     PT Goal 1  Goal Identifier: transfers  Goal Description: Patient will be able to transfer sit-supine with no limitation due to dizziness for decreased risk for falls.  Rationale: to maximize safety and independence with performance of ADLs and functional tasks  Target Date: 01/03/24  PT Goal 2  Goal Identifier: balance  Goal Description: Patient will score 19/24 on DGI indicating low risk for falls.  Rationale: to maximize safety and independence with performance of ADLs and functional tasks;to maximize safety and independence within the home  Target Date: 01/03/24      Frequency of Treatment: 1x/week  Duration of Treatment: 4 weeks    Recommended Referrals to Other Professionals: NA  Education Assessment:   Learner/Method: Patient;Listening  Education Comments: vestibular dysfuction    Risks and benefits of evaluation/treatment have been explained.   Patient/Family/caregiver agrees with Plan of Care.     Evaluation Time:     PT Eval, High Complexity Minutes (86196): 25       Signing Clinician: Selina Ndiaye PT

## 2024-01-08 NOTE — PROGRESS NOTES
DISCHARGE  Reason for Discharge: Patient has not been seen since evaluation on 12/6/23.  It has been greater than 30 days since last visit.    Equipment Issued: none    Discharge Plan: Not able to reassess due to unplanned discharge.    Referring Provider:  Keven Bhagat       12/06/23 0500   Appointment Info   Signing clinician's name / credentials Bari Garcia, PT   Visits Used 1   Medical Diagnosis BPPV, vertigo   PT Tx Diagnosis vestibular dysfunction   Precautions/Limitations none   Progress Note/Certification   Onset of illness/injury or Date of Surgery 11/01/23   Therapy Frequency 1x/week   Predicted Duration 4 weeks   Certification date from 12/06/23   Certification date to 01/03/24   Progress Note Due Date 01/03/24   GOALS   PT Goals 2   PT Goal 1   Goal Identifier transfers   Goal Description Patient will be able to transfer sit-supine with no limitation due to dizziness for decreased risk for falls.   Rationale to maximize safety and independence with performance of ADLs and functional tasks   Target Date 01/03/24   PT Goal 2   Goal Identifier balance   Goal Description Patient will score 19/24 on DGI indicating low risk for falls.   Rationale to maximize safety and independence with performance of ADLs and functional tasks;to maximize safety and independence within the home   Target Date 01/03/24   Treatment Interventions (PT)   Interventions Therapeutic Procedure/Exercise;Standard Canalith Repositioning   Therapeutic Procedure/Exercise   Therapeutic Procedures: strength, endurance, ROM, flexibillity minutes (83566) 10   Ther Proc 1 Instructed ongoing symptom monitoring and appropriate follow up.  Instructed results of evaluation, treatment, and therapy plan of care.   Patient Response/Progress pt voiced understanding.   Standard Canalith Repositioning   Standard canalith repositioning procedure minutes (65956) 10   Patient Response/Progress no spinning with re-test after CRM   Canalith  Repositioning - Details Trial of CRM for left posterior canal; With initial sit-supine motion, nystagmus ws immediate this time lasting about 9 seconds and turning into downbeats, non-fatiguing;  Completed CRM; Re-test after CRM resulted in immediate low-amplitude downbeats with no subjective symptoms; Nystagmus was non-fatiguing;   Eval/Assessments   PT Eval, High Complexity Minutes (88710) 25   Education   Learner/Method Patient;Listening   Education Comments vestibular dysfuction   Total Session Time   Timed Code Treatment Minutes 10   Total Treatment Time (sum of timed and untimed services) 45

## 2024-02-28 ENCOUNTER — NURSE TRIAGE (OUTPATIENT)
Dept: FAMILY MEDICINE | Facility: OTHER | Age: 69
End: 2024-02-28
Payer: COMMERCIAL

## 2024-02-28 NOTE — TELEPHONE ENCOUNTER
Agree she should be seen.  I think Dr. Freeman is working tomorrow.  Xavier Urrutia MD on 2/28/2024 at 11:46 AM

## 2024-02-28 NOTE — TELEPHONE ENCOUNTER
"S-(situation): Patient took her blood pressure last night and it was 174/86 at work. Blood pressure was lower at end of shift. Has \"ball\" under great toe that causes her pain when she is on her feet all day.    B-(background): Patient does not have a history of blood pressure issues, however she did states she was stressed at work lately. 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     A-(assessment): Hypertensive incidence x 1, foot pain    R-(recommendations): Will route to provider. Recommend the patient be seen for foot pain evaluation.  Yuly Jade RN on 2/28/2024 at 11:33 AM        Reason for Disposition   Systolic BP >= 160 OR Diastolic >= 100    Additional Information   Negative: Symptom is main concern (e.g., headache, chest pain)   Negative: Low blood pressure is main concern   Negative: Systolic BP >= 160 OR Diastolic >= 100, and any cardiac (e.g., breathing difficulty, chest pain) or neurologic symptoms (e.g., new-onset blurred or double vision)   Negative: Pregnant 20 or more weeks (or postpartum < 6 weeks) with new hand or face swelling   Negative: Pregnant 20 or more weeks (or postpartum < 6 weeks) and Systolic BP >= 160 OR Diastolic >= 110   Negative: Patient sounds very sick or weak to the triager   Negative: Systolic BP >= 200 OR Diastolic >= 120 and having NO cardiac or neurologic symptoms   Negative: Pregnant 20 or more weeks (or postpartum < 6 weeks) with Systolic BP >= 140 OR Diastolic >= 90   Negative: Systolic BP >= 180 OR Diastolic >= 110, and missed most recent dose of blood pressure medication   Negative: Systolic BP >= 180 OR Diastolic >= 110   Negative: Patient wants to be seen   Negative: Ran out of BP medications   Negative: Taking BP medications and feels is having side effects (e.g., impotence, cough, dizziness)    Answer Assessment - Initial Assessment Questions  1. BLOOD PRESSURE: \"What is the blood pressure?\" \"Did you take at least two " "measurements 5 minutes apart?\"        2. ONSET: \"When did you take your blood pressure?\"      Last night  3. HOW: \"How did you take your blood pressure?\" (e.g., automatic home BP monitor, visiting nurse)      Home cuff  4. HISTORY: \"Do you have a history of high blood pressure?\"      Family history  5. MEDICINES: \"Are you taking any medicines for blood pressure?\" \"Have you missed any doses recently?\"      none  6. OTHER SYMPTOMS: \"Do you have any symptoms?\" (e.g., blurred vision, chest pain, difficulty breathing, headache, weakness)      no  7. PREGNANCY: \"Is there any chance you are pregnant?\" \"When was your last menstrual period?\"      no    Protocols used: Blood Pressure - High-A-OH    "

## 2024-02-28 NOTE — TELEPHONE ENCOUNTER
Called patient, informed her of provider response. Transferred patient to scheduling to assist in making an appointment. Patient in agreement with this plan.    Yuly Jade RN on 2/28/2024 at 12:13 PM

## 2024-02-29 ENCOUNTER — OFFICE VISIT (OUTPATIENT)
Dept: FAMILY MEDICINE | Facility: OTHER | Age: 69
End: 2024-02-29
Attending: FAMILY MEDICINE
Payer: COMMERCIAL

## 2024-02-29 VITALS
WEIGHT: 189 LBS | HEIGHT: 62 IN | OXYGEN SATURATION: 96 % | DIASTOLIC BLOOD PRESSURE: 70 MMHG | BODY MASS INDEX: 34.78 KG/M2 | SYSTOLIC BLOOD PRESSURE: 118 MMHG | TEMPERATURE: 97.8 F | RESPIRATION RATE: 18 BRPM | HEART RATE: 73 BPM

## 2024-02-29 DIAGNOSIS — E66.01 MORBID OBESITY (H): ICD-10-CM

## 2024-02-29 DIAGNOSIS — Z12.31 ENCOUNTER FOR SCREENING MAMMOGRAM FOR BREAST CANCER: Primary | ICD-10-CM

## 2024-02-29 DIAGNOSIS — M35.00 SJOGREN'S SYNDROME, WITH UNSPECIFIED ORGAN INVOLVEMENT (H): ICD-10-CM

## 2024-02-29 DIAGNOSIS — I10 ESSENTIAL HYPERTENSION: ICD-10-CM

## 2024-02-29 LAB
ANION GAP SERPL CALCULATED.3IONS-SCNC: 8 MMOL/L (ref 7–15)
BUN SERPL-MCNC: 15.9 MG/DL (ref 8–23)
CALCIUM SERPL-MCNC: 9.9 MG/DL (ref 8.8–10.2)
CHLORIDE SERPL-SCNC: 104 MMOL/L (ref 98–107)
CREAT SERPL-MCNC: 0.89 MG/DL (ref 0.51–0.95)
DEPRECATED HCO3 PLAS-SCNC: 30 MMOL/L (ref 22–29)
EGFRCR SERPLBLD CKD-EPI 2021: 70 ML/MIN/1.73M2
GLUCOSE SERPL-MCNC: 86 MG/DL (ref 70–99)
HOLD SPECIMEN: NORMAL
POTASSIUM SERPL-SCNC: 3.8 MMOL/L (ref 3.4–5.3)
SODIUM SERPL-SCNC: 142 MMOL/L (ref 135–145)

## 2024-02-29 PROCEDURE — 80048 BASIC METABOLIC PNL TOTAL CA: CPT | Mod: ZL | Performed by: FAMILY MEDICINE

## 2024-02-29 PROCEDURE — G0463 HOSPITAL OUTPT CLINIC VISIT: HCPCS

## 2024-02-29 PROCEDURE — 99213 OFFICE O/P EST LOW 20 MIN: CPT | Performed by: FAMILY MEDICINE

## 2024-02-29 PROCEDURE — 36415 COLL VENOUS BLD VENIPUNCTURE: CPT | Mod: ZL | Performed by: FAMILY MEDICINE

## 2024-02-29 RX ORDER — LISINOPRIL/HYDROCHLOROTHIAZIDE 10-12.5 MG
1 TABLET ORAL DAILY
Qty: 90 TABLET | Refills: 3 | Status: SHIPPED | OUTPATIENT
Start: 2024-02-29 | End: 2024-08-29

## 2024-02-29 NOTE — COMMUNITY RESOURCES LIST (ENGLISH)
02/29/2024   [facility]  N/A  For questions about this resource list or additional care needs, please contact your primary care clinic or care manager.  Phone: [phone]   Email: N/A   Address: [address] [place] [id number]   Hours: N/A        Food and Nutrition       Food pantry  1  [facility] Distance: 6.12 miles      In-Person   [address] [place] [address]  Language: English  Hours: Mon - Thu 11:00 AM - 3:30 PM  Fees: Free   Phone: (440) 116-8504 Email: info@Hello[employer] Website: https://HelloFax     2  [facility] Distance: 17.92 miles      Pickup   [address] [place] [address]  Language: English  Hours: Thu 10:00 AM - 1:00 PM  Fees: Free   Phone: (571) 559-6781 Email: felecia@[employer] Website: https://www.[employer].BugHerd/DeerRiverAreaFoodShelf/     SNAP application assistance  3  [facility] Distance: 7.4 miles      [address] [place] [address] [place] [address]  Language: English  Hours: Mon - Fri 8:00 AM - 4:30 PM  Fees: Free   Phone: (354) 329-2514 Website: https://www.[facility]/Health-Human-Services     4  [employer] [facility] Distance: 7.4 miles      In-Person, Phone/Virtual   [address] [facility] [place] [address]  Language: English  Hours: Mon - Fri 8:00 AM - 4:30 PM  Fees: Free   Phone: (222) 469-8350 Website: https://www.[employer].BugHerd/partner/gxxzrbwhz-jxaleryg-faqpqwblgcr-agency-[facility]     Soup kitchen or free meals  5  [facility] Distance: 23.95 miles      Pickup   [address] [place] [address]  Language: English  Hours: Mon - Fri 4:00 PM - 4:45 PM  Fees: Free   Phone: (959) 418-8533 Email: tatiana@[facility].salvationarmy.org Website: https://central[place].salvationarmy.org/northern/[place]          Important Numbers & Websites        Emergency Services   911  [facility] Services   [facility]  Poison Control   (590) 911-5083  Suicide Prevention Lifeline   (573) 893-7886 (TALK)  Child Abuse Hotline   (673) 502-8833 (4-A-Child)  Sexual Assault Hotline   (431) 867-9729 (HOPE)  National Runaway Safeline   (894) 361-7735 (RUNAWAY)  All-Options Talkline   (658) 337-2827  Substance Abuse Referral   (459) 425-3348 (HELP)

## 2024-02-29 NOTE — PROGRESS NOTES
"  Assessment & Plan     Essential hypertension  Isolated episode of high blood pressure.  Recommend monitoring on a daily basis.  She was given a card to keep track of it.  Continue her current antihypertensive meds.  Repeat BMP is pending.  Reinforced importance of low-salt low caffeine.  - lisinopril-hydrochlorothiazide (ZESTORETIC) 10-12.5 MG tablet; Take 1 tablet by mouth daily  - Basic Metabolic Panel; Future  - Basic Metabolic Panel    Sjogren's syndrome, with unspecified organ involvement (H24)      Morbid obesity (H)  Encourage increased movement and dietary changes    Encounter for screening mammogram for breast cancer  Due for annual mammogram  - MA Screening Bilateral w/ Db; Future            BMI  Estimated body mass index is 35.13 kg/m  as calculated from the following:    Height as of this encounter: 1.562 m (5' 1.5\").    Weight as of this encounter: 85.7 kg (189 lb).   Weight management plan: Discussed healthy diet and exercise guidelines      There are no Patient Instructions on file for this visit.    No follow-ups on file.    Subjective   Steffany Altamirano is a 68 year old, presenting for the following health issues:  Hypertension    67 yo female presents for HTN, she has not been seen in over a year.  She has been compliant with her blood pressure meds.  Denies excessive use of caffeine.  Non-smoker.    Episode of elevated BP at work.  She had a mild headache, blood pressure was elevated at 170/90.  After a few minutes of rest returned to normal.  She has had no recurrent episodes of hypertension.    Reports her exercise tolerance is normal.  No chest pain shortness of breath or dyspnea on exertion.    She has pain at the base of her right great toe, plantar surface.    History of Present Illness       Reason for visit:  Foot pain    She eats 0-1 servings of fruits and vegetables daily.She consumes 0 sweetened beverage(s) daily.She exercises with enough effort to increase her heart rate 60 or more " "minutes per day.  She exercises with enough effort to increase her heart rate 6 days per week.   She is taking medications regularly.                 Review of Systems  Constitutional, HEENT, cardiovascular, pulmonary, gi and gu systems are negative, except as otherwise noted.      Objective    /70   Pulse 73   Temp 97.8  F (36.6  C) (Tympanic)   Resp 18   Ht 1.562 m (5' 1.5\")   Wt 85.7 kg (189 lb)   LMP  (LMP Unknown)   SpO2 96%   Breastfeeding No   BMI 35.13 kg/m    Body mass index is 35.13 kg/m .  Physical Exam   GENERAL: alert and no distress  NECK: no adenopathy, no asymmetry, masses, or scars  RESP: lungs clear to auscultation - no rales, rhonchi or wheezes  CV: regular rate and rhythm, normal S1 S2, no S3 or S4, no murmur, click or rub, no peripheral edema  MS: Thick callus base first MTP.            Signed Electronically by: Cece Freeman MD    "

## 2024-02-29 NOTE — NURSING NOTE
Patient is here for concerns of elevated BP. States has been running high. Checked 4 times at work recently, 176/86. States will get dizzy feeling when it is high.     No LMP recorded (lmp unknown). Patient has had a hysterectomy.  Medication Reconciliation: complete    Rylie Hein LPN 2/29/2024 2:43 PM       Advance care directive on file? no  Advance care directive provided to patient? no       Rylie Hein LPN

## 2024-06-06 ENCOUNTER — HOSPITAL ENCOUNTER (OUTPATIENT)
Dept: GENERAL RADIOLOGY | Facility: OTHER | Age: 69
Discharge: HOME | End: 2024-06-06
Attending: NURSE PRACTITIONER
Payer: COMMERCIAL

## 2024-06-06 ENCOUNTER — OFFICE VISIT (OUTPATIENT)
Dept: FAMILY MEDICINE | Facility: OTHER | Age: 69
End: 2024-06-06
Attending: NURSE PRACTITIONER
Payer: COMMERCIAL

## 2024-06-06 VITALS
SYSTOLIC BLOOD PRESSURE: 136 MMHG | HEIGHT: 62 IN | RESPIRATION RATE: 18 BRPM | DIASTOLIC BLOOD PRESSURE: 78 MMHG | WEIGHT: 190.2 LBS | OXYGEN SATURATION: 97 % | BODY MASS INDEX: 35 KG/M2 | HEART RATE: 56 BPM | TEMPERATURE: 97.4 F

## 2024-06-06 DIAGNOSIS — E66.01 MORBID OBESITY (H): ICD-10-CM

## 2024-06-06 DIAGNOSIS — M16.12 PRIMARY OSTEOARTHRITIS OF LEFT HIP: Primary | ICD-10-CM

## 2024-06-06 DIAGNOSIS — M25.552 HIP PAIN, LEFT: ICD-10-CM

## 2024-06-06 DIAGNOSIS — M79.605 PAIN OF LEFT LOWER EXTREMITY: ICD-10-CM

## 2024-06-06 PROCEDURE — 99214 OFFICE O/P EST MOD 30 MIN: CPT | Performed by: NURSE PRACTITIONER

## 2024-06-06 PROCEDURE — G0463 HOSPITAL OUTPT CLINIC VISIT: HCPCS

## 2024-06-06 PROCEDURE — 73502 X-RAY EXAM HIP UNI 2-3 VIEWS: CPT

## 2024-06-06 PROCEDURE — G0463 HOSPITAL OUTPT CLINIC VISIT: HCPCS | Mod: 25

## 2024-06-06 RX ORDER — NAPROXEN 500 MG/1
500 TABLET ORAL 2 TIMES DAILY WITH MEALS
Qty: 60 TABLET | Refills: 0 | Status: SHIPPED | OUTPATIENT
Start: 2024-06-06

## 2024-06-06 ASSESSMENT — PAIN SCALES - GENERAL: PAINLEVEL: NO PAIN (0)

## 2024-06-06 NOTE — PROGRESS NOTES
"  Assessment & Plan   Problem List Items Addressed This Visit          Digestive    Obesity (BMI 35.0-39.9) with comorbidity (H)     Other Visit Diagnoses       Primary osteoarthritis of left hip    -  Primary    Relevant Medications    naproxen (NAPROSYN) 500 MG tablet    Hip pain, left        Relevant Medications    naproxen (NAPROSYN) 500 MG tablet    Other Relevant Orders    XR Pelvis and Hip Left 1 View (Completed)    Pain of left lower extremity        Relevant Medications    naproxen (NAPROSYN) 500 MG tablet    Other Relevant Orders    XR Pelvis and Hip Left 1 View (Completed)           X-ray updated, degenerative changes  but no other acute findings.  Will do a trial of naproxen twice daily with food.  If this is not beneficial, consider physical therapy.  Does have morbid obesity, likely playing a factor into her hip pain.        No follow-ups on file.      Subjective   Steffany Altamirano is a 68 year old, presenting for the following health issues:  Musculoskeletal Problem    Musculoskeletal Problem    History of Present Illness       Reason for visit:  Left hip and leg pain  Symptom onset:  More than a month  Symptoms include:  Left hip and leg pain that radiates  Symptom intensity:  Moderate  Symptom progression:  Staying the same  Had these symptoms before:  No  What makes it worse:  No  What makes it better:  No      She comes in today for evaluation of left leg pain, starts in her hip and radiates down to her foot.  This been present for over a month.  Reports no injuries, has not tried anything to help with the pain.  Did try some new shoes as she is a [employer]  and is on her feet a lot.  This has not been helpful.  Reports the pain is a dull ache.  No history of low back issues.          Objective    /78   Pulse 56   Temp 97.4  F (36.3  C)   Resp 18   Ht 1.562 m (5' 1.5\")   Wt 86.3 kg (190 lb 3.2 oz)   LMP  (LMP Unknown)   SpO2 97%   BMI 35.36 kg/m    Body mass index is 35.36 " kg/m .  Physical Exam   GENERAL: alert and no distress  EYES: Eyes grossly normal to inspection  MS: tender over left greater trochanter, normal ROM of left hip/leg  SKIN: no suspicious lesions or rashes  NEURO: Normal strength and tone, mentation intact and speech normal  PSYCH: mentation appears normal, affect normal/bright    Results for orders placed or performed during the hospital encounter of 06/06/24   XR Pelvis and Hip Left 1 View     Status: None    Narrative    PROCEDURE: XR PELVIS AND HIP LEFT 1 VIEW 6/6/2024 12:55 PM    HISTORY: Hip pain, left; Pain of left lower extremity    COMPARISONS: None.    TECHNIQUE: AP view the pelvis and lateral view of the left hip.    FINDINGS: There is mild narrowing of hip joint spaces, without  significant spur formation or subchondral sclerosis.    Sacroiliac joints appear fairly normal and symmetrical. No fracture or  dislocation is seen.         Impression    IMPRESSION: Mild degenerative change.    SANGEETA SOSA MD         SYSTEM ID:  [id number]             Signed Electronically by: NAYE Chester CNP

## 2024-06-06 NOTE — NURSING NOTE
Patient presents today for left hip and leg pain. Patient reports that she works 8 hours a day standing on concrete.    Medication Reconciliation Complete    Kaylee Mejia LPN  6/6/2024 12:32 PM

## 2024-06-06 NOTE — PATIENT INSTRUCTIONS
Take naproxen twice daily with food to help reduce inflammation for the next couple weeks  I would then recommend  taking Tylenol arthritis 1 to 2 tablets twice daily  Consider physical therapy if pain persist

## 2024-06-12 ENCOUNTER — TELEPHONE (OUTPATIENT)
Dept: FAMILY MEDICINE | Facility: OTHER | Age: 69
End: 2024-06-12
Payer: COMMERCIAL

## 2024-06-12 NOTE — TELEPHONE ENCOUNTER
TYP-patient is returning a call    Please call and advise    Thank You    Merlyn Benavides on 6/12/2024 at 1:11 PM

## 2024-08-29 DIAGNOSIS — I10 ESSENTIAL HYPERTENSION: ICD-10-CM

## 2024-08-29 NOTE — TELEPHONE ENCOUNTER
Patient has [facility] coverage and is eligible to get certain prescriptions as a 100-day supply.      Prescriptions updated to 100-day supply: lisinopril-hydrochlorothiazide      Ashly Zepeda PharmD, [facility]  Retail Pharmacy Specialist  [phone]

## 2024-08-30 RX ORDER — LISINOPRIL/HYDROCHLOROTHIAZIDE 10-12.5 MG
1 TABLET ORAL DAILY
Qty: 100 TABLET | Refills: 2 | Status: SHIPPED | OUTPATIENT
Start: 2024-08-30

## 2024-09-10 ENCOUNTER — HOSPITAL ENCOUNTER (OUTPATIENT)
Dept: GENERAL RADIOLOGY | Facility: OTHER | Age: 69
Discharge: HOME | End: 2024-09-10
Attending: FAMILY MEDICINE
Payer: COMMERCIAL

## 2024-09-10 ENCOUNTER — OFFICE VISIT (OUTPATIENT)
Dept: FAMILY MEDICINE | Facility: OTHER | Age: 69
End: 2024-09-10
Attending: FAMILY MEDICINE
Payer: COMMERCIAL

## 2024-09-10 ENCOUNTER — ORDERS ONLY (AUTO-RELEASED) (OUTPATIENT)
Dept: FAMILY MEDICINE | Facility: OTHER | Age: 69
End: 2024-09-10

## 2024-09-10 VITALS
OXYGEN SATURATION: 96 % | RESPIRATION RATE: 16 BRPM | DIASTOLIC BLOOD PRESSURE: 82 MMHG | BODY MASS INDEX: 35.55 KG/M2 | HEIGHT: 62 IN | SYSTOLIC BLOOD PRESSURE: 134 MMHG | HEART RATE: 57 BPM | WEIGHT: 193.2 LBS | TEMPERATURE: 97.8 F

## 2024-09-10 DIAGNOSIS — M53.3 SACROILIAC JOINT DYSFUNCTION OF LEFT SIDE: ICD-10-CM

## 2024-09-10 DIAGNOSIS — I10 ESSENTIAL HYPERTENSION: ICD-10-CM

## 2024-09-10 DIAGNOSIS — Z11.59 ENCOUNTER FOR HEPATITIS C SCREENING TEST FOR LOW RISK PATIENT: ICD-10-CM

## 2024-09-10 DIAGNOSIS — Z12.31 ENCOUNTER FOR SCREENING MAMMOGRAM FOR BREAST CANCER: Primary | ICD-10-CM

## 2024-09-10 DIAGNOSIS — Z12.11 COLON CANCER SCREENING: ICD-10-CM

## 2024-09-10 LAB
ALBUMIN SERPL BCG-MCNC: 3.9 G/DL (ref 3.5–5.2)
ALP SERPL-CCNC: 83 U/L (ref 40–150)
ALT SERPL W P-5'-P-CCNC: 19 U/L (ref 0–50)
ANION GAP SERPL CALCULATED.3IONS-SCNC: 9 MMOL/L (ref 7–15)
AST SERPL W P-5'-P-CCNC: 23 U/L (ref 0–45)
BILIRUB SERPL-MCNC: 0.5 MG/DL
BUN SERPL-MCNC: 14.6 MG/DL (ref 8–23)
CALCIUM SERPL-MCNC: 9.7 MG/DL (ref 8.8–10.4)
CHLORIDE SERPL-SCNC: 105 MMOL/L (ref 98–107)
CHOLEST SERPL-MCNC: 226 MG/DL
CREAT SERPL-MCNC: 0.76 MG/DL (ref 0.51–0.95)
EGFRCR SERPLBLD CKD-EPI 2021: 84 ML/MIN/1.73M2
FASTING STATUS PATIENT QL REPORTED: YES
FASTING STATUS PATIENT QL REPORTED: YES
GLUCOSE SERPL-MCNC: 102 MG/DL (ref 70–99)
HCO3 SERPL-SCNC: 30 MMOL/L (ref 22–29)
HCV AB SERPL QL IA: NONREACTIVE
HDLC SERPL-MCNC: 82 MG/DL
LDLC SERPL CALC-MCNC: 129 MG/DL
NONHDLC SERPL-MCNC: 144 MG/DL
POTASSIUM SERPL-SCNC: 4.3 MMOL/L (ref 3.4–5.3)
PROT SERPL-MCNC: 7 G/DL (ref 6.4–8.3)
SODIUM SERPL-SCNC: 144 MMOL/L (ref 135–145)
TRIGL SERPL-MCNC: 77 MG/DL

## 2024-09-10 PROCEDURE — 80061 LIPID PANEL: CPT | Mod: ZL | Performed by: FAMILY MEDICINE

## 2024-09-10 PROCEDURE — 86803 HEPATITIS C AB TEST: CPT | Mod: ZL | Performed by: FAMILY MEDICINE

## 2024-09-10 PROCEDURE — G0463 HOSPITAL OUTPT CLINIC VISIT: HCPCS

## 2024-09-10 PROCEDURE — 99214 OFFICE O/P EST MOD 30 MIN: CPT | Performed by: FAMILY MEDICINE

## 2024-09-10 PROCEDURE — 36415 COLL VENOUS BLD VENIPUNCTURE: CPT | Mod: ZL | Performed by: FAMILY MEDICINE

## 2024-09-10 PROCEDURE — G0463 HOSPITAL OUTPT CLINIC VISIT: HCPCS | Mod: 25

## 2024-09-10 PROCEDURE — 80053 COMPREHEN METABOLIC PANEL: CPT | Mod: ZL | Performed by: FAMILY MEDICINE

## 2024-09-10 PROCEDURE — 72100 X-RAY EXAM L-S SPINE 2/3 VWS: CPT

## 2024-09-10 ASSESSMENT — PAIN SCALES - GENERAL: PAINLEVEL: WORST PAIN (10)

## 2024-09-10 NOTE — NURSING NOTE
"Chief Complaint   Patient presents with    Follow Up     Sciatic never pain       Initial /82   Pulse 57   Temp 97.8  F (36.6  C) (Tympanic)   Resp 16   Ht 1.575 m (5' 2\")   Wt 87.6 kg (193 lb 3.2 oz)   LMP  (LMP Unknown)   SpO2 96%   BMI 35.34 kg/m   Estimated body mass index is 35.34 kg/m  as calculated from the following:    Height as of this encounter: 1.575 m (5' 2\").    Weight as of this encounter: 87.6 kg (193 lb 3.2 oz).  Medication Review: complete    The next two questions are to help us understand your food security.  If you are feeling you need any assistance in this area, we have resources available to support you today.          2/29/2024   SDOH- Food Insecurity   Within the past 12 months, did you worry that your food would run out before you got money to buy more? N   Within the past 12 months, did the food you bought just not last and you didn t have money to get more? Y            Health Care Directive:  Patient does not have a Health Care Directive or Living Will: Discussed advance care planning with patient; however, patient declined at this time.    Sridevi Roldan LPN      "

## 2024-09-10 NOTE — PROGRESS NOTES
"  Assessment & Plan     Sacroiliac joint dysfunction of left side  Pain seems most consistent with SI joint dysfunction.  No recent x-rays of lumbar spine so we will proceed with those today.  Reviewed left hip x-ray that showed only mild osteoarthritis.  Hip exam today was normal.  She does have some tightness along the IT band and tenderness.  Proceed with chiropractor at her request.  Declines physical therapy.  - XR Lumbar Spine 2/3 Views; Future  - Chiropractic Referral; Future    Encounter for screening mammogram for breast cancer  Overdue for mammogram.  - MA Screening Bilateral w/ Db; Future    Essential hypertension  Blood pressure under good control.  Will continue Zestoretic.  Repeat lipid panel and CMP today.  - Lipid Panel; Future  - Comprehensive Metabolic Panel; Future  - Lipid Panel  - Comprehensive Metabolic Panel    Colon cancer screening  Due for colon cancer.  Had a colonoscopy 2014 with hyperplastic polyp.  She does not feel she has time for colonoscopy given prep and need for .  She has a higher risk of false positive with history of polyp but will proceed with Cologuard.  - COLOGUARD([employer]); Future    Encounter for hepatitis C screening test for low risk patient  Due for hepatitis C  - Hepatitis C Screen Reflex to HCV RNA Quant and Genotype; Future  - Hepatitis C Screen Reflex to HCV RNA Quant and Genotype      Declines vaccinations    BMI  Estimated body mass index is 35.34 kg/m  as calculated from the following:    Height as of this encounter: 1.575 m (5' 2\").    Weight as of this encounter: 87.6 kg (193 lb 3.2 oz).   Weight management plan: Discussed healthy diet and exercise guidelines      There are no Patient Instructions on file for this visit.    No follow-ups on file.    Nikhil Jeter is a 68 year old, presenting for the following health issues:  Follow Up (Sciatic never pain)      67 yo female presents with 3 months of left sided low back pain that radiates into " "the left lateral hip and into the left dorsal foot.    Standing on concrete floors. >40 hrs/day, pain is intermittent.  She denies any numbness tingling or weakness in the extremity.  No recent trauma or injury.  Using anti-inflammatories with some improvement.  It is impacting her gait.  She was seen by another provider.  Left hip x-ray showed mild osteoarthritis.    Overdue for labs, currently on Zestoretic for hypertension.  Appears well-controlled.  History of Present Illness       Reason for visit:  Check narinder She is missing 2 dose(s) of medications per week.                 Review of Systems  Constitutional, neuro, ENT, endocrine, pulmonary, cardiac, gastrointestinal, genitourinary, musculoskeletal, integument and psychiatric systems are negative, except as otherwise noted.      Objective    /82   Pulse 57   Temp 97.8  F (36.6  C) (Tympanic)   Resp 16   Ht 1.575 m (5' 2\")   Wt 87.6 kg (193 lb 3.2 oz)   LMP  (LMP Unknown)   SpO2 96%   BMI 35.34 kg/m    Body mass index is 35.34 kg/m .  Physical Exam  Musculoskeletal:      Lumbar back: Tenderness present. No spasms or bony tenderness. Decreased range of motion. Negative left straight leg raise test.      Left hip: Normal. No deformity, lacerations, tenderness, bony tenderness or crepitus. Normal range of motion. Normal strength.      Comments: Tenderness over the left SI joint and IT band.        GENERAL: alert and no distress  RESP: lungs clear to auscultation - no rales, rhonchi or wheezes  CV: regular rate and rhythm, normal S1 S2, no S3 or S4, no murmur, click or rub, no peripheral edema  NEURO: Normal strength and tone, mentation intact and speech normal  PSYCH: mentation appears normal, affect normal/bright    No results found for this or any previous visit (from the past 24 hour(s)).        Signed Electronically by: Cece Freeman MD    "

## 2024-10-11 ENCOUNTER — HOSPITAL ENCOUNTER (OUTPATIENT)
Dept: MAMMOGRAPHY | Facility: OTHER | Age: 69
Discharge: HOME | End: 2024-10-11
Attending: FAMILY MEDICINE | Admitting: FAMILY MEDICINE
Payer: COMMERCIAL

## 2024-10-11 DIAGNOSIS — Z12.31 ENCOUNTER FOR SCREENING MAMMOGRAM FOR BREAST CANCER: ICD-10-CM

## 2024-10-11 PROCEDURE — 77063 BREAST TOMOSYNTHESIS BI: CPT

## 2025-04-25 ENCOUNTER — HOSPITAL ENCOUNTER (OUTPATIENT)
Dept: CARDIOLOGY | Facility: OTHER | Age: 70
Discharge: HOME | End: 2025-04-25
Attending: FAMILY MEDICINE
Payer: COMMERCIAL

## 2025-04-25 DIAGNOSIS — I10 HYPERTENSION, UNSPECIFIED TYPE: ICD-10-CM

## 2025-04-25 DIAGNOSIS — R60.0 BILATERAL LOWER EXTREMITY EDEMA: ICD-10-CM

## 2025-04-25 LAB — LVEF ECHO: NORMAL

## 2025-04-25 PROCEDURE — 93306 TTE W/DOPPLER COMPLETE: CPT

## 2025-05-25 ENCOUNTER — ORDERS ONLY (AUTO-RELEASED) (OUTPATIENT)
Dept: FAMILY MEDICINE | Facility: OTHER | Age: 70
End: 2025-05-25
Payer: COMMERCIAL

## 2025-05-25 DIAGNOSIS — Z12.11 COLON CANCER SCREENING: ICD-10-CM

## (undated) RX ORDER — METHYLPREDNISOLONE SODIUM SUCCINATE 125 MG/2ML
INJECTION, POWDER, LYOPHILIZED, FOR SOLUTION INTRAMUSCULAR; INTRAVENOUS
Status: DISPENSED
Start: 2018-12-18

## (undated) RX ORDER — CEFTRIAXONE SODIUM 1 G/50ML
INJECTION, SOLUTION INTRAVENOUS
Status: DISPENSED
Start: 2020-05-20

## (undated) RX ORDER — SODIUM CHLORIDE 9 MG/ML
INJECTION, SOLUTION INTRAVENOUS
Status: DISPENSED
Start: 2020-05-20

## (undated) RX ORDER — DIPHENHYDRAMINE HYDROCHLORIDE 50 MG/ML
INJECTION INTRAMUSCULAR; INTRAVENOUS
Status: DISPENSED
Start: 2020-05-20

## (undated) RX ORDER — METHYLPREDNISOLONE SODIUM SUCCINATE 125 MG/2ML
INJECTION, POWDER, LYOPHILIZED, FOR SOLUTION INTRAMUSCULAR; INTRAVENOUS
Status: DISPENSED
Start: 2020-05-20

## (undated) RX ORDER — ACETAMINOPHEN 500 MG
TABLET ORAL
Status: DISPENSED
Start: 2020-05-20